# Patient Record
Sex: MALE | Race: WHITE | Employment: FULL TIME | URBAN - METROPOLITAN AREA
[De-identification: names, ages, dates, MRNs, and addresses within clinical notes are randomized per-mention and may not be internally consistent; named-entity substitution may affect disease eponyms.]

---

## 2017-01-19 ENCOUNTER — APPOINTMENT (OUTPATIENT)
Dept: LAB | Age: 62
End: 2017-01-19
Attending: INTERNAL MEDICINE
Payer: COMMERCIAL

## 2017-01-19 DIAGNOSIS — R73.9 HYPERGLYCEMIA: ICD-10-CM

## 2017-01-19 LAB
GLUCOSE SERPL-MCNC: 227 MG/DL (ref 70–99)
HBA1C MFR BLD: 10.3 % (ref 4–6)

## 2017-01-19 PROCEDURE — 83036 HEMOGLOBIN GLYCOSYLATED A1C: CPT

## 2017-01-19 PROCEDURE — 82947 ASSAY GLUCOSE BLOOD QUANT: CPT

## 2017-01-19 PROCEDURE — 36415 COLL VENOUS BLD VENIPUNCTURE: CPT

## 2017-01-21 ENCOUNTER — TELEPHONE (OUTPATIENT)
Dept: INTERNAL MEDICINE CLINIC | Facility: CLINIC | Age: 62
End: 2017-01-21

## 2017-01-22 NOTE — TELEPHONE ENCOUNTER
I called the patient with the results of the blood glucose over 200 fasting and also hemoglobin A1c of 10.3. This was repeated because the patient initially when he was high he stated that he was drinking Coca[employer]a.   Now he was fasting and he confirms norbert

## 2017-01-26 ENCOUNTER — OFFICE VISIT (OUTPATIENT)
Dept: INTERNAL MEDICINE CLINIC | Facility: CLINIC | Age: 62
End: 2017-01-26

## 2017-01-26 VITALS
DIASTOLIC BLOOD PRESSURE: 93 MMHG | WEIGHT: 243.81 LBS | SYSTOLIC BLOOD PRESSURE: 163 MMHG | BODY MASS INDEX: 32 KG/M2 | TEMPERATURE: 98 F | HEART RATE: 67 BPM

## 2017-01-26 DIAGNOSIS — E11.9 NEWLY DIAGNOSED DIABETES (HCC): Primary | ICD-10-CM

## 2017-01-26 DIAGNOSIS — E55.9 VITAMIN D DEFICIENCY: ICD-10-CM

## 2017-01-26 DIAGNOSIS — E78.5 HYPERLIPIDEMIA LDL GOAL <100: ICD-10-CM

## 2017-01-26 DIAGNOSIS — I10 ESSENTIAL HYPERTENSION: ICD-10-CM

## 2017-01-26 PROCEDURE — 99212 OFFICE O/P EST SF 10 MIN: CPT | Performed by: INTERNAL MEDICINE

## 2017-01-26 PROCEDURE — 99214 OFFICE O/P EST MOD 30 MIN: CPT | Performed by: INTERNAL MEDICINE

## 2017-01-26 RX ORDER — ATORVASTATIN CALCIUM 20 MG/1
20 TABLET, FILM COATED ORAL NIGHTLY
Qty: 90 TABLET | Refills: 1 | Status: SHIPPED | OUTPATIENT
Start: 2017-01-26 | End: 2017-04-27

## 2017-01-26 RX ORDER — LOSARTAN POTASSIUM 25 MG/1
25 TABLET ORAL DAILY
Qty: 90 TABLET | Refills: 1 | Status: SHIPPED | OUTPATIENT
Start: 2017-01-26 | End: 2017-04-27

## 2017-01-26 NOTE — PROGRESS NOTES
HPI:    Patient ID: Darius Draper is a 62year old male. Patient presents to discuss his lab results. Diabetes  He presents for his initial (HgbA1C on 1/19/2017 was 10.3) diabetic visit. He has type 2 diabetes mellitus.  His disease course has been wors Endocrine: Negative for polydipsia, polyphagia and polyuria. HISTORY:  History reviewed. No pertinent past medical history.        Past Surgical History    REPAIR BOWEL-SKIN FISTULA N/A     Comment 34 years ago       Family History   Problem Relatio ASSESSMENT/PLAN:   (E11.9) Newly diagnosed diabetes ([facility] [address]  (primary encounter diagnosis)  1/19/2017 HgA1c 10.3- uncontrolled. AST 16 and ALT 30. Patient denies any associated symptoms such as polydipsia, polyuria, or polyphagia.  He denies any FH test done in 3 months. The patient was instructed to follow a low-fat diet. Instructed the following: Eat fewer animal products in general. Eat less/leaner cuts of red meat. Replace red meat with fish and poultry.  To eat eat more fiber-rich foods that

## 2017-01-27 ENCOUNTER — TELEPHONE (OUTPATIENT)
Dept: INTERNAL MEDICINE CLINIC | Facility: CLINIC | Age: 62
End: 2017-01-27

## 2017-01-27 NOTE — TELEPHONE ENCOUNTER
Wife would like to speak to RN about pt testing strips and kit that was suppose to have been prescribed to him.

## 2017-01-30 RX ORDER — LANCETS 30 GAUGE
EACH MISCELLANEOUS
Qty: 200 EACH | Refills: 1 | Status: SHIPPED | OUTPATIENT
Start: 2017-01-30

## 2017-01-30 NOTE — TELEPHONE ENCOUNTER
ASSESSMENT/PLAN:    (E11.9) Newly diagnosed diabetes (HCC)  (primary encounter diagnosis)  1/19/2017 HgA1c 10.3- uncontrolled. AST 16 and ALT 30. Patient denies any associated symptoms such as polydipsia, polyuria, or polyphagia.  He denies any FHx of Pb Cipro

## 2017-01-30 NOTE — TELEPHONE ENCOUNTER
Wife wondering if necessary for pt to check glucose daily. I advised her it is recommended pt check his sugar as prescribed, especially as new diabetic with new rx regimen. Symptoms of hypoglycemia discussed and wife verbalized understanding.  Pt will call

## 2017-02-13 ENCOUNTER — TELEPHONE (OUTPATIENT)
Dept: INTERNAL MEDICINE CLINIC | Facility: CLINIC | Age: 62
End: 2017-02-13

## 2017-02-14 NOTE — TELEPHONE ENCOUNTER
Pt was prescribed a new meter with test strips. He also needs lancets. Could we please send in a script for lancets?

## 2017-02-17 NOTE — TELEPHONE ENCOUNTER
LMTCB please transfer to triage or x [phone] if by 5pm today. According to pt chart, all diabetic supplies were ordered 1/30/17. Unsure if he needs lancets, if so, any specific one?

## 2017-02-17 NOTE — TELEPHONE ENCOUNTER
Pt's wife is calling back. Pt's wife spoke to Pharmacy states they did received Rx for lancets, after they had told her they didn't. No call back needed.

## 2017-03-13 ENCOUNTER — TELEPHONE (OUTPATIENT)
Dept: INTERNAL MEDICINE CLINIC | Facility: CLINIC | Age: 62
End: 2017-03-13

## 2017-03-14 NOTE — TELEPHONE ENCOUNTER
Informed pt per Dr. Coates Prim message below. Repeat lab test in April. And f/u, Verbalized understanding.

## 2017-03-14 NOTE — TELEPHONE ENCOUNTER
Returned call to spouse even though message was clearly relayed to pt directly.   Message left to return call to office if needs further clarification

## 2017-03-14 NOTE — TELEPHONE ENCOUNTER
Reviewed. To continue monitoring the blood pressure. The patient glucose was also high and he needs to make a follow-up appointment sometime for follow-up for blood pressure and diabetes.

## 2017-03-14 NOTE — TELEPHONE ENCOUNTER
Per 1/26/17 OV were to follow up with blood pressure readings. Patient denies any symptoms, headache, nausea, dizziness.   Stated feels normal.

## 2017-03-14 NOTE — TELEPHONE ENCOUNTER
Pt's wife is calling for provide Pt's B/p reading.  Please advise    3-:415/68  3-:150/89  4-:915/63

## 2017-03-14 NOTE — TELEPHONE ENCOUNTER
[facility] please transfer to triage or x [phone] if by 5pm today.

## 2017-04-21 ENCOUNTER — TELEPHONE (OUTPATIENT)
Dept: INTERNAL MEDICINE CLINIC | Facility: CLINIC | Age: 62
End: 2017-04-21

## 2017-04-21 NOTE — TELEPHONE ENCOUNTER
Pt's wife stts pt has red bumps on arms and feels fatigue. Wife asking for an nurse to call pt.        Pt can be reached @ [phone]

## 2017-04-21 NOTE — TELEPHONE ENCOUNTER
Actions Requested: JACE PALMER, acute visitbooked 4/21  Situation/Background   Problem: rash   Onset: 1 week   Associated Symptoms: none   History of Same:    Precipitated By:    Aggravating/Alleviating Factors:    Timing:    Possible Etiology:  Infection:   ,

## 2017-04-27 ENCOUNTER — OFFICE VISIT (OUTPATIENT)
Dept: INTERNAL MEDICINE CLINIC | Facility: CLINIC | Age: 62
End: 2017-04-27

## 2017-04-27 VITALS
DIASTOLIC BLOOD PRESSURE: 84 MMHG | TEMPERATURE: 99 F | WEIGHT: 232.19 LBS | BODY MASS INDEX: 30 KG/M2 | SYSTOLIC BLOOD PRESSURE: 124 MMHG | HEART RATE: 61 BPM

## 2017-04-27 DIAGNOSIS — I10 ESSENTIAL HYPERTENSION: ICD-10-CM

## 2017-04-27 DIAGNOSIS — E78.5 HYPERLIPIDEMIA LDL GOAL <100: Primary | ICD-10-CM

## 2017-04-27 DIAGNOSIS — IMO0001 UNCONTROLLED DIABETES MELLITUS TYPE 2 WITHOUT COMPLICATIONS, UNSPECIFIED LONG TERM INSULIN USE STATUS: ICD-10-CM

## 2017-04-27 DIAGNOSIS — E55.9 VITAMIN D DEFICIENCY: ICD-10-CM

## 2017-04-27 PROCEDURE — 99214 OFFICE O/P EST MOD 30 MIN: CPT | Performed by: INTERNAL MEDICINE

## 2017-04-27 PROCEDURE — 99212 OFFICE O/P EST SF 10 MIN: CPT | Performed by: INTERNAL MEDICINE

## 2017-04-27 RX ORDER — ATORVASTATIN CALCIUM 20 MG/1
20 TABLET, FILM COATED ORAL NIGHTLY
Qty: 90 TABLET | Refills: 1 | Status: SHIPPED | OUTPATIENT
Start: 2017-04-27 | End: 2017-04-27

## 2017-04-27 RX ORDER — LOSARTAN POTASSIUM 25 MG/1
25 TABLET ORAL DAILY
Qty: 90 TABLET | Refills: 1 | Status: SHIPPED | OUTPATIENT
Start: 2017-04-27 | End: 2018-02-05

## 2017-04-27 RX ORDER — ATORVASTATIN CALCIUM 20 MG/1
20 TABLET, FILM COATED ORAL NIGHTLY
Qty: 90 TABLET | Refills: 1 | Status: SHIPPED | OUTPATIENT
Start: 2017-04-27 | End: 2018-05-31

## 2017-04-27 NOTE — PROGRESS NOTES
HPI:    Patient ID: Juan Ricci is a 62year old male. PMHx includes Vitamin D Deficiency. HPI  Diabetes  He presents for his initial diabetic visit. He has type 2 diabetes mellitus. Disease course: Labs on 01/19/2017 indicated Hg A1c at 10.3.  Pertinent HISTORY:  History reviewed. No pertinent past medical history.        Past Surgical History    REPAIR BOWEL-SKIN FISTULA N/A     Comment 34 years ago       Family History   Problem Relation Age of Onset   • Cancer Paternal Grandmother         Smoking St HDL 34 11/12/2016     Lab Results  Component Value Date   * 11/12/2016     Lab Results  Component Value Date   TRIG 148 11/12/2016     Lab Results  Component Value Date   AST 16 11/12/2016     Lab Results  Component Value Date   ALT 30 11/12/2016 RANDOM URINE  - LIPID PANEL  - HEMOGLOBIN A1C          (E55.9) Vitamin D deficiency  Labs on 11/12/2016 show Vitamin D, 25OH at 11.8. He does not currently take supplements for this.    Plan:   - VITAMIN D, 25-HYDROXY      Meds This Visit:  Pending Prescrip

## 2017-06-15 ENCOUNTER — APPOINTMENT (OUTPATIENT)
Dept: LAB | Facility: HOSPITAL | Age: 62
End: 2017-06-15
Attending: INTERNAL MEDICINE
Payer: COMMERCIAL

## 2017-06-15 DIAGNOSIS — E78.5 HYPERLIPIDEMIA LDL GOAL <100: ICD-10-CM

## 2017-06-15 DIAGNOSIS — IMO0001 UNCONTROLLED DIABETES MELLITUS TYPE 2 WITHOUT COMPLICATIONS, UNSPECIFIED LONG TERM INSULIN USE STATUS: ICD-10-CM

## 2017-06-15 DIAGNOSIS — E55.9 VITAMIN D DEFICIENCY: ICD-10-CM

## 2017-06-15 PROCEDURE — 80061 LIPID PANEL: CPT

## 2017-06-15 PROCEDURE — 82306 VITAMIN D 25 HYDROXY: CPT

## 2017-06-15 PROCEDURE — 82043 UR ALBUMIN QUANTITATIVE: CPT

## 2017-06-15 PROCEDURE — 83036 HEMOGLOBIN GLYCOSYLATED A1C: CPT

## 2017-06-15 PROCEDURE — 82570 ASSAY OF URINE CREATININE: CPT

## 2017-06-15 PROCEDURE — 36415 COLL VENOUS BLD VENIPUNCTURE: CPT

## 2017-06-17 ENCOUNTER — TELEPHONE (OUTPATIENT)
Dept: INTERNAL MEDICINE CLINIC | Facility: CLINIC | Age: 62
End: 2017-06-17

## 2017-06-17 DIAGNOSIS — E11.9 TYPE 2 DIABETES MELLITUS WITHOUT COMPLICATION, UNSPECIFIED LONG TERM INSULIN USE STATUS: Primary | ICD-10-CM

## 2017-06-17 DIAGNOSIS — E55.9 VITAMIN D DEFICIENCY: ICD-10-CM

## 2017-06-17 NOTE — TELEPHONE ENCOUNTER
Patient's wife on hippa form requesting results of blood work that   Patient had done on Thursday.  Please call back to advise

## 2017-06-19 NOTE — TELEPHONE ENCOUNTER
Verified name and  with pt wife. Pt has called twice for results. Informed pt wife need to be reviewed by Dr Marcel Rubio and pt wife verb understanding.

## 2017-06-20 NOTE — TELEPHONE ENCOUNTER
Informed spouse test results and Dr. Sasha Edmond message. Low cholesterol diet info mailed orders placed for repeat vit D. Verbalized understanding.

## 2017-06-20 NOTE — TELEPHONE ENCOUNTER
Recent was 20.1 and the normal is 0-20.0. Cholesterol panel show LDL elevated at 131. Ideally should be under 100. Patient needs to work with his diet.   Please send the patient low-cholesterol diet information from epic and sent to the patient with the

## 2017-10-20 DIAGNOSIS — I10 ESSENTIAL HYPERTENSION: ICD-10-CM

## 2018-02-04 ENCOUNTER — TELEPHONE (OUTPATIENT)
Dept: INTERNAL MEDICINE CLINIC | Facility: CLINIC | Age: 63
End: 2018-02-04

## 2018-02-04 DIAGNOSIS — I10 ESSENTIAL HYPERTENSION: ICD-10-CM

## 2018-02-05 RX ORDER — LOSARTAN POTASSIUM 25 MG/1
TABLET ORAL
Qty: 90 TABLET | Refills: 0 | Status: SHIPPED | OUTPATIENT
Start: 2018-02-05 | End: 2018-05-09

## 2018-02-05 NOTE — TELEPHONE ENCOUNTER
Wife Ashley David (on HIPAA) called about refill request; advised the blood pressure medication sent to pharmacy. Appointment scheduled for the patient with Dr. Danna Duncan as wife is a patient of Dr. Danna Duncan.  Patient scheduled for Monday 2/19/18 at 12:20 pm.    Dr. Edwards File

## 2018-02-19 ENCOUNTER — APPOINTMENT (OUTPATIENT)
Dept: LAB | Facility: HOSPITAL | Age: 63
End: 2018-02-19
Attending: INTERNAL MEDICINE
Payer: COMMERCIAL

## 2018-02-19 ENCOUNTER — OFFICE VISIT (OUTPATIENT)
Dept: INTERNAL MEDICINE CLINIC | Facility: CLINIC | Age: 63
End: 2018-02-19

## 2018-02-19 VITALS
DIASTOLIC BLOOD PRESSURE: 74 MMHG | HEIGHT: 73.5 IN | HEART RATE: 82 BPM | BODY MASS INDEX: 30.75 KG/M2 | WEIGHT: 237.06 LBS | SYSTOLIC BLOOD PRESSURE: 130 MMHG | TEMPERATURE: 98 F

## 2018-02-19 DIAGNOSIS — E11.69 DYSLIPIDEMIA ASSOCIATED WITH TYPE 2 DIABETES MELLITUS (HCC): ICD-10-CM

## 2018-02-19 DIAGNOSIS — E78.5 DYSLIPIDEMIA ASSOCIATED WITH TYPE 2 DIABETES MELLITUS (HCC): ICD-10-CM

## 2018-02-19 DIAGNOSIS — Z00.00 ANNUAL PHYSICAL EXAM: ICD-10-CM

## 2018-02-19 DIAGNOSIS — E11.9 TYPE 2 DIABETES MELLITUS WITHOUT COMPLICATION, WITHOUT LONG-TERM CURRENT USE OF INSULIN (HCC): ICD-10-CM

## 2018-02-19 DIAGNOSIS — Z00.00 ANNUAL PHYSICAL EXAM: Primary | ICD-10-CM

## 2018-02-19 DIAGNOSIS — I10 ESSENTIAL HYPERTENSION: ICD-10-CM

## 2018-02-19 LAB
ALBUMIN SERPL BCP-MCNC: 4.4 G/DL (ref 3.5–4.8)
ALBUMIN/GLOB SERPL: 1.6 {RATIO} (ref 1–2)
ALP SERPL-CCNC: 44 U/L (ref 32–100)
ALT SERPL-CCNC: 28 U/L (ref 17–63)
ANION GAP SERPL CALC-SCNC: 8 MMOL/L (ref 0–18)
AST SERPL-CCNC: 18 U/L (ref 15–41)
BILIRUB SERPL-MCNC: 0.9 MG/DL (ref 0.3–1.2)
BUN SERPL-MCNC: 10 MG/DL (ref 8–20)
BUN/CREAT SERPL: 10.3 (ref 10–20)
CALCIUM SERPL-MCNC: 9.3 MG/DL (ref 8.5–10.5)
CHLORIDE SERPL-SCNC: 105 MMOL/L (ref 95–110)
CHOLEST SERPL-MCNC: 204 MG/DL (ref 110–200)
CO2 SERPL-SCNC: 25 MMOL/L (ref 22–32)
CREAT SERPL-MCNC: 0.97 MG/DL (ref 0.5–1.5)
CREAT UR-MCNC: 87.9 MG/DL
ERYTHROCYTE [DISTWIDTH] IN BLOOD BY AUTOMATED COUNT: 13.8 % (ref 11–15)
GLOBULIN PLAS-MCNC: 2.8 G/DL (ref 2.5–3.7)
GLUCOSE SERPL-MCNC: 162 MG/DL (ref 70–99)
HBA1C MFR BLD: 7.4 % (ref 4–6)
HCT VFR BLD AUTO: 48.4 % (ref 41–52)
HDLC SERPL-MCNC: 42 MG/DL
HGB BLD-MCNC: 16.1 G/DL (ref 13.5–17.5)
LDLC SERPL CALC-MCNC: 128 MG/DL (ref 0–99)
MCH RBC QN AUTO: 28.7 PG (ref 27–32)
MCHC RBC AUTO-ENTMCNC: 33.2 G/DL (ref 32–37)
MCV RBC AUTO: 86.6 FL (ref 80–100)
MICROALBUMIN UR-MCNC: 23 MG/DL (ref 0–1.8)
MICROALBUMIN/CREAT UR: 261.7 MG/G{CREAT} (ref 0–20)
NONHDLC SERPL-MCNC: 162 MG/DL
OSMOLALITY UR CALC.SUM OF ELEC: 289 MOSM/KG (ref 275–295)
PATIENT FASTING: YES
PLATELET # BLD AUTO: 147 K/UL (ref 140–400)
PMV BLD AUTO: 9.2 FL (ref 7.4–10.3)
POTASSIUM SERPL-SCNC: 4.3 MMOL/L (ref 3.3–5.1)
PROT SERPL-MCNC: 7.2 G/DL (ref 5.9–8.4)
PSA SERPL-MCNC: 1.7 NG/ML (ref 0–4)
RBC # BLD AUTO: 5.59 M/UL (ref 4.5–5.9)
SODIUM SERPL-SCNC: 138 MMOL/L (ref 136–144)
TRIGL SERPL-MCNC: 168 MG/DL (ref 1–149)
WBC # BLD AUTO: 6 K/UL (ref 4–11)

## 2018-02-19 PROCEDURE — 80061 LIPID PANEL: CPT

## 2018-02-19 PROCEDURE — 83036 HEMOGLOBIN GLYCOSYLATED A1C: CPT

## 2018-02-19 PROCEDURE — 82570 ASSAY OF URINE CREATININE: CPT

## 2018-02-19 PROCEDURE — 36415 COLL VENOUS BLD VENIPUNCTURE: CPT

## 2018-02-19 PROCEDURE — 85027 COMPLETE CBC AUTOMATED: CPT

## 2018-02-19 PROCEDURE — 99396 PREV VISIT EST AGE 40-64: CPT | Performed by: INTERNAL MEDICINE

## 2018-02-19 PROCEDURE — 82043 UR ALBUMIN QUANTITATIVE: CPT

## 2018-02-19 PROCEDURE — 80053 COMPREHEN METABOLIC PANEL: CPT

## 2018-02-19 NOTE — PATIENT INSTRUCTIONS
Await results of today's blood and urine testing. Please schedule an appointment with Ophthalmology. Continue current medications. Please try to eat healthy, exercise regularly and lose weight. Return visit in 6 months.

## 2018-02-26 ENCOUNTER — TELEPHONE (OUTPATIENT)
Dept: INTERNAL MEDICINE CLINIC | Facility: CLINIC | Age: 63
End: 2018-02-26

## 2018-02-26 NOTE — TELEPHONE ENCOUNTER
Patient's wife, Flavia Olivas, aware lab results and Dr. Doe Serum instructions. Per wife, she is not sure if patient is taking current dose of atorvastatin 20 mg nightly.  As a result, wife states patient will hold off on increasing atorvastatin to 40 mg nightly

## 2018-05-09 DIAGNOSIS — I10 ESSENTIAL HYPERTENSION: ICD-10-CM

## 2018-05-10 RX ORDER — LOSARTAN POTASSIUM 25 MG/1
TABLET ORAL
Qty: 90 TABLET | Refills: 0 | Status: SHIPPED | OUTPATIENT
Start: 2018-05-10 | End: 2018-10-25

## 2018-05-10 NOTE — TELEPHONE ENCOUNTER
Hypertensive Medications Protocol Passed5/9 8:34 PM   CMP or BMP in past 12 months    Serum Creatinine < 2.0    Appointment in past 6 or next 3 months     Medication refilled for 90 days per protocol.

## 2018-05-30 ENCOUNTER — TELEPHONE (OUTPATIENT)
Dept: INTERNAL MEDICINE CLINIC | Facility: CLINIC | Age: 63
End: 2018-05-30

## 2018-05-31 DIAGNOSIS — E78.5 HYPERLIPIDEMIA LDL GOAL <100: ICD-10-CM

## 2018-05-31 RX ORDER — ATORVASTATIN CALCIUM 20 MG/1
20 TABLET, FILM COATED ORAL NIGHTLY
Qty: 90 TABLET | Refills: 1 | Status: ON HOLD | OUTPATIENT
Start: 2018-05-31 | End: 2019-07-13

## 2018-05-31 NOTE — TELEPHONE ENCOUNTER
Protocol failed, please advise on prescription request  Cholesterol Medications  Protocol Criteria:  · Appointment scheduled in the past 12 months or in the next 3 months  · ALT & LDL on file in the past 12 months  · ALT result < 80  · LDL result <130   Re

## 2018-05-31 NOTE — TELEPHONE ENCOUNTER
Pt stts refill on RX Atorvastatin 20 MG. Please advise           Current Outpatient Prescriptions:        Atorvastatin Calcium 20 MG Oral Tab Take 1 tablet (20 mg total) by mouth nightly.  Disp: 90 tablet Rfl: 1

## 2018-06-01 NOTE — TELEPHONE ENCOUNTER
[address], [phone], [phone]   Medication Detail      Disp Refills Start End    atorvastatin 20 MG Oral Tab 90 tablet 1 5/31/2018     Sig - Route:  Take 1 tablet (

## 2018-06-10 DIAGNOSIS — IMO0001 UNCONTROLLED DIABETES MELLITUS TYPE 2 WITHOUT COMPLICATIONS: ICD-10-CM

## 2018-08-09 ENCOUNTER — TELEPHONE (OUTPATIENT)
Dept: CASE MANAGEMENT | Age: 63
End: 2018-08-09

## 2018-09-25 ENCOUNTER — TELEPHONE (OUTPATIENT)
Dept: CASE MANAGEMENT | Age: 63
End: 2018-09-25

## 2018-09-25 NOTE — TELEPHONE ENCOUNTER
Patient is due for DM eye exam. Report never received from previous eye doctor. Left message to call back [phone].

## 2018-10-25 DIAGNOSIS — I10 ESSENTIAL HYPERTENSION: ICD-10-CM

## 2018-10-27 RX ORDER — LOSARTAN POTASSIUM 25 MG/1
TABLET ORAL
Qty: 90 TABLET | Refills: 0 | Status: SHIPPED | OUTPATIENT
Start: 2018-10-27 | End: 2019-03-19 | Stop reason: DRUGHIGH

## 2019-01-01 ENCOUNTER — EXTERNAL RECORD (OUTPATIENT)
Dept: HEALTH INFORMATION MANAGEMENT | Facility: OTHER | Age: 64
End: 2019-01-01

## 2019-02-01 DIAGNOSIS — I10 ESSENTIAL HYPERTENSION: ICD-10-CM

## 2019-02-15 DIAGNOSIS — I10 ESSENTIAL HYPERTENSION: ICD-10-CM

## 2019-02-16 NOTE — TELEPHONE ENCOUNTER
Please review; protocol failed.     Pt overdue for OV    CSS- please schedule pt    Routed to MD for review

## 2019-02-16 NOTE — TELEPHONE ENCOUNTER
Recent Visits  Date Type Provider Dept   02/19/18 Office Visit Jamel Hubbard MD [facility]-Internal Med   Showing recent visits within past 540 days with a meds authorizing provider and meeting all other requirements     Future Appointments  No visits were found meeting these conditions.    Showing future appointments within next 150 days with a meds authorizing provider and meeting all other requirements

## 2019-02-19 RX ORDER — LOSARTAN POTASSIUM 25 MG/1
TABLET ORAL
Qty: 30 TABLET | Refills: 0 | OUTPATIENT
Start: 2019-02-19

## 2019-02-19 NOTE — TELEPHONE ENCOUNTER
I have not seen this patient in the office. I refilled last time as he was patient of Dr. Sarah Landers. Patient needs to follow up. Last seen by Dr Gaurav Dodge in 2/18.

## 2019-03-06 RX ORDER — LOSARTAN POTASSIUM 25 MG/1
TABLET ORAL
Qty: 90 TABLET | Refills: 0 | OUTPATIENT
Start: 2019-03-06

## 2019-03-19 ENCOUNTER — APPOINTMENT (OUTPATIENT)
Dept: LAB | Facility: HOSPITAL | Age: 64
End: 2019-03-19
Attending: INTERNAL MEDICINE
Payer: COMMERCIAL

## 2019-03-19 ENCOUNTER — OFFICE VISIT (OUTPATIENT)
Dept: INTERNAL MEDICINE CLINIC | Facility: CLINIC | Age: 64
End: 2019-03-19

## 2019-03-19 VITALS
WEIGHT: 241 LBS | HEIGHT: 73.5 IN | BODY MASS INDEX: 31.26 KG/M2 | DIASTOLIC BLOOD PRESSURE: 82 MMHG | SYSTOLIC BLOOD PRESSURE: 148 MMHG | HEART RATE: 79 BPM

## 2019-03-19 DIAGNOSIS — R80.9 TYPE 2 DIABETES MELLITUS WITH MICROALBUMINURIA, WITHOUT LONG-TERM CURRENT USE OF INSULIN (HCC): ICD-10-CM

## 2019-03-19 DIAGNOSIS — E11.29 TYPE 2 DIABETES MELLITUS WITH MICROALBUMINURIA, WITHOUT LONG-TERM CURRENT USE OF INSULIN (HCC): ICD-10-CM

## 2019-03-19 DIAGNOSIS — I10 ESSENTIAL HYPERTENSION: ICD-10-CM

## 2019-03-19 DIAGNOSIS — Z00.00 ANNUAL PHYSICAL EXAM: ICD-10-CM

## 2019-03-19 DIAGNOSIS — E78.5 DYSLIPIDEMIA ASSOCIATED WITH TYPE 2 DIABETES MELLITUS (HCC): ICD-10-CM

## 2019-03-19 DIAGNOSIS — E11.69 DYSLIPIDEMIA ASSOCIATED WITH TYPE 2 DIABETES MELLITUS (HCC): ICD-10-CM

## 2019-03-19 DIAGNOSIS — Z00.00 ANNUAL PHYSICAL EXAM: Primary | ICD-10-CM

## 2019-03-19 LAB
ALBUMIN SERPL-MCNC: 3.9 G/DL (ref 3.4–5)
ALBUMIN/GLOB SERPL: 1.2 {RATIO} (ref 1–2)
ALP LIVER SERPL-CCNC: 66 U/L (ref 45–117)
ALT SERPL-CCNC: 32 U/L (ref 16–61)
ANION GAP SERPL CALC-SCNC: 7 MMOL/L (ref 0–18)
AST SERPL-CCNC: 11 U/L (ref 15–37)
BILIRUB SERPL-MCNC: 0.5 MG/DL (ref 0.1–2)
BUN BLD-MCNC: 16 MG/DL (ref 7–18)
BUN/CREAT SERPL: 15.4 (ref 10–20)
CALCIUM BLD-MCNC: 8.9 MG/DL (ref 8.5–10.1)
CHLORIDE SERPL-SCNC: 110 MMOL/L (ref 98–107)
CHOLEST SMN-MCNC: 198 MG/DL (ref ?–200)
CO2 SERPL-SCNC: 24 MMOL/L (ref 21–32)
COMPLEXED PSA SERPL-MCNC: 1.32 NG/ML (ref ?–4)
CREAT BLD-MCNC: 1.04 MG/DL (ref 0.7–1.3)
CREAT UR-SCNC: 134 MG/DL
DEPRECATED RDW RBC AUTO: 40.6 FL (ref 35.1–46.3)
ERYTHROCYTE [DISTWIDTH] IN BLOOD BY AUTOMATED COUNT: 13.2 % (ref 11–15)
EST. AVERAGE GLUCOSE BLD GHB EST-MCNC: 212 MG/DL (ref 68–126)
GLOBULIN PLAS-MCNC: 3.2 G/DL (ref 2.8–4.4)
GLUCOSE BLD-MCNC: 217 MG/DL (ref 70–99)
HBA1C MFR BLD HPLC: 9 % (ref ?–5.7)
HCT VFR BLD AUTO: 49.3 % (ref 39–53)
HDLC SERPL-MCNC: 37 MG/DL (ref 40–59)
HGB BLD-MCNC: 16 G/DL (ref 13–17.5)
LDLC SERPL CALC-MCNC: 132 MG/DL (ref ?–100)
M PROTEIN MFR SERPL ELPH: 7.1 G/DL (ref 6.4–8.2)
MCH RBC QN AUTO: 28.1 PG (ref 26–34)
MCHC RBC AUTO-ENTMCNC: 32.5 G/DL (ref 31–37)
MCV RBC AUTO: 86.6 FL (ref 80–100)
MICROALBUMIN UR-MCNC: 14.7 MG/DL
MICROALBUMIN/CREAT 24H UR-RTO: 109.7 UG/MG (ref ?–30)
NONHDLC SERPL-MCNC: 161 MG/DL (ref ?–130)
OSMOLALITY SERPL CALC.SUM OF ELEC: 300 MOSM/KG (ref 275–295)
PLATELET # BLD AUTO: 165 10(3)UL (ref 150–450)
POTASSIUM SERPL-SCNC: 4.7 MMOL/L (ref 3.5–5.1)
RBC # BLD AUTO: 5.69 X10(6)UL (ref 4.3–5.7)
SODIUM SERPL-SCNC: 141 MMOL/L (ref 136–145)
TRIGL SERPL-MCNC: 145 MG/DL (ref 30–149)
VLDLC SERPL CALC-MCNC: 29 MG/DL (ref 0–30)
WBC # BLD AUTO: 4.4 X10(3) UL (ref 4–11)

## 2019-03-19 PROCEDURE — 80061 LIPID PANEL: CPT

## 2019-03-19 PROCEDURE — 99396 PREV VISIT EST AGE 40-64: CPT | Performed by: INTERNAL MEDICINE

## 2019-03-19 PROCEDURE — 82570 ASSAY OF URINE CREATININE: CPT

## 2019-03-19 PROCEDURE — 80053 COMPREHEN METABOLIC PANEL: CPT

## 2019-03-19 PROCEDURE — 82043 UR ALBUMIN QUANTITATIVE: CPT

## 2019-03-19 PROCEDURE — 83036 HEMOGLOBIN GLYCOSYLATED A1C: CPT

## 2019-03-19 PROCEDURE — 36415 COLL VENOUS BLD VENIPUNCTURE: CPT

## 2019-03-19 PROCEDURE — 85027 COMPLETE CBC AUTOMATED: CPT

## 2019-03-19 RX ORDER — LOSARTAN POTASSIUM 50 MG/1
50 TABLET ORAL DAILY
Qty: 90 TABLET | Refills: 0 | Status: ON HOLD | OUTPATIENT
Start: 2019-03-19 | End: 2019-07-15

## 2019-03-19 NOTE — H&P
Chanell Castrejon is a 59year old male who presents for a complete physical exam, as well as for follow-up of type 2 diabetes, hypertension and dyslipidemia. HPI:   His last visit here was for a physical February 2018.   He feels well, and he has not seen a ph History:   Diagnosis Date   • Diabetic nephropathy ([facility]     Microalbuminuria   • Dyslipidemia associated with type 2 diabetes mellitus ([facility]    • Essential hypertension    • Type 2 diabetes mellitus ([facility] 2016      Past Surgical History:   Procedure Later without ulcerations or lesions  PULSES: Carotid upstrokes 2+ without carotid bruits, distal pulses 2+ bilateral dorsalis pedis and posterior tibial  NEURO: Sensory testing with the 10 g monofilament diabetic sensory exam instrument is normal in both feet

## 2019-03-19 NOTE — PATIENT INSTRUCTIONS
Await results of today's blood and urine testing. Please increase losartan to 50 mg daily. Continue other medications. Please schedule an appointment with Ophthalmology for an eye exam.  Return visit in 1 month.

## 2019-03-22 ENCOUNTER — TELEPHONE (OUTPATIENT)
Dept: INTERNAL MEDICINE CLINIC | Facility: CLINIC | Age: 64
End: 2019-03-22

## 2019-03-22 NOTE — TELEPHONE ENCOUNTER
Lab Results:   Written by Paul Forrester MD on 3/19/2019  3:20 PM   Your chemistry panel is normal except for an elevated glucose.  Blood count is normal.  Glycohemoglobin is elevated at 9.0% (target 7.0%).  Cholesterol panel is normal except for low HDL

## 2019-03-26 NOTE — TELEPHONE ENCOUNTER
Patient's wife notified of lab results. Patient's wife verbalizes understanding of Dr. Elia Sosa instructions. Patient authorized wife to receive results on 3/22/19. Wife states script for Meformin 1000 mg twice daily needs to be sent to pharmacy.    Wi

## 2019-03-26 NOTE — TELEPHONE ENCOUNTER
I would recommend that he take glimepiride in addition to metformin. However if he wants he can simply take metformin and see how he does.

## 2019-03-27 ENCOUNTER — TELEPHONE (OUTPATIENT)
Dept: OTHER | Age: 64
End: 2019-03-27

## 2019-05-22 RX ORDER — LOSARTAN POTASSIUM 25 MG/1
TABLET ORAL
Qty: 90 TABLET | Refills: 0 | OUTPATIENT
Start: 2019-05-22

## 2019-06-14 ENCOUNTER — TELEPHONE (OUTPATIENT)
Dept: CASE MANAGEMENT | Age: 64
End: 2019-06-14

## 2019-06-14 DIAGNOSIS — E11.9 TYPE 2 DIABETES MELLITUS WITHOUT COMPLICATION, WITHOUT LONG-TERM CURRENT USE OF INSULIN (HCC): Primary | ICD-10-CM

## 2019-06-14 NOTE — TELEPHONE ENCOUNTER
Your diabetic patient has an A1C of 9 or more. In order to help gain control of the diabetes, we are generating a referral for the patient to be seen by the Endocrine service for diabetic evaluation.  Please sign the referral, and we will contact the genia

## 2019-06-20 NOTE — TELEPHONE ENCOUNTER
Referral for DM evaluation with Britany ABBASI in file and patient also due for DM eye exam, referral in file. Left message to call back [phone].

## 2019-07-10 ENCOUNTER — PATIENT OUTREACH (OUTPATIENT)
Dept: CASE MANAGEMENT | Age: 64
End: 2019-07-10

## 2019-07-10 NOTE — PROGRESS NOTES
Patient is due for DM consult with Zach ABBASI and DM eye exam. Referrals in file. Left message to call back [phone].

## 2019-07-13 ENCOUNTER — HOSPITAL ENCOUNTER (INPATIENT)
Facility: HOSPITAL | Age: 64
LOS: 2 days | Discharge: HOME | DRG: 247 | End: 2019-07-15
Attending: EMERGENCY MEDICINE | Admitting: INTERNAL MEDICINE
Payer: COMMERCIAL

## 2019-07-13 ENCOUNTER — APPOINTMENT (OUTPATIENT)
Dept: INTERVENTIONAL RADIOLOGY/VASCULAR | Facility: HOSPITAL | Age: 64
DRG: 247 | End: 2019-07-13
Attending: INTERNAL MEDICINE
Payer: COMMERCIAL

## 2019-07-13 ENCOUNTER — APPOINTMENT (OUTPATIENT)
Dept: GENERAL RADIOLOGY | Facility: HOSPITAL | Age: 64
DRG: 247 | End: 2019-07-13
Attending: EMERGENCY MEDICINE
Payer: COMMERCIAL

## 2019-07-13 DIAGNOSIS — I24.9 ACUTE CORONARY SYNDROME (HCC): Primary | ICD-10-CM

## 2019-07-13 LAB
ALBUMIN SERPL-MCNC: 3.9 G/DL (ref 3.4–5)
ALP LIVER SERPL-CCNC: 72 U/L (ref 45–117)
ALT SERPL-CCNC: 38 U/L (ref 16–61)
ANION GAP SERPL CALC-SCNC: 6 MMOL/L (ref 0–18)
APTT PPP: 21.1 SECONDS (ref 23.2–35.3)
AST SERPL-CCNC: 33 U/L (ref 15–37)
BASOPHILS # BLD AUTO: 0.02 X10(3) UL (ref 0–0.2)
BASOPHILS NFR BLD AUTO: 0.3 %
BILIRUB DIRECT SERPL-MCNC: 0.1 MG/DL (ref 0–0.2)
BILIRUB SERPL-MCNC: 0.6 MG/DL (ref 0.1–2)
BUN BLD-MCNC: 14 MG/DL (ref 7–18)
BUN/CREAT SERPL: 12.4 (ref 10–20)
CALCIUM BLD-MCNC: 8.5 MG/DL (ref 8.5–10.1)
CHLORIDE SERPL-SCNC: 107 MMOL/L (ref 98–112)
CHOLEST SMN-MCNC: 192 MG/DL (ref ?–200)
CO2 SERPL-SCNC: 27 MMOL/L (ref 21–32)
CREAT BLD-MCNC: 1.13 MG/DL (ref 0.7–1.3)
DEPRECATED RDW RBC AUTO: 42.2 FL (ref 35.1–46.3)
EOSINOPHIL # BLD AUTO: 0.17 X10(3) UL (ref 0–0.7)
EOSINOPHIL NFR BLD AUTO: 2.4 %
ERYTHROCYTE [DISTWIDTH] IN BLOOD BY AUTOMATED COUNT: 13.5 % (ref 11–15)
GLUCOSE BLD-MCNC: 171 MG/DL (ref 70–99)
HCT VFR BLD AUTO: 46.7 % (ref 39–53)
HDLC SERPL-MCNC: 38 MG/DL (ref 40–59)
HGB BLD-MCNC: 15.4 G/DL (ref 13–17.5)
IMM GRANULOCYTES # BLD AUTO: 0.02 X10(3) UL (ref 0–1)
IMM GRANULOCYTES NFR BLD: 0.3 %
INR BLD: 0.98 (ref 0.9–1.2)
LDLC SERPL DIRECT ASSAY-MCNC: 128 MG/DL (ref ?–100)
LYMPHOCYTES # BLD AUTO: 1.48 X10(3) UL (ref 1–4)
LYMPHOCYTES NFR BLD AUTO: 20.5 %
M PROTEIN MFR SERPL ELPH: 7 G/DL (ref 6.4–8.2)
MCH RBC QN AUTO: 28.5 PG (ref 26–34)
MCHC RBC AUTO-ENTMCNC: 33 G/DL (ref 31–37)
MCV RBC AUTO: 86.5 FL (ref 80–100)
MONOCYTES # BLD AUTO: 0.67 X10(3) UL (ref 0.1–1)
MONOCYTES NFR BLD AUTO: 9.3 %
NEUTROPHILS # BLD AUTO: 4.87 X10 (3) UL (ref 1.5–7.7)
NEUTROPHILS # BLD AUTO: 4.87 X10(3) UL (ref 1.5–7.7)
NEUTROPHILS NFR BLD AUTO: 67.2 %
NONHDLC SERPL-MCNC: 154 MG/DL (ref ?–130)
OSMOLALITY SERPL CALC.SUM OF ELEC: 295 MOSM/KG (ref 275–295)
PLATELET # BLD AUTO: 179 10(3)UL (ref 150–450)
POTASSIUM SERPL-SCNC: 4.3 MMOL/L (ref 3.5–5.1)
PROTHROMBIN TIME: 12.8 SECONDS (ref 11.8–14.5)
RBC # BLD AUTO: 5.4 X10(6)UL (ref 4.3–5.7)
SODIUM SERPL-SCNC: 140 MMOL/L (ref 136–145)
TRIGL SERPL-MCNC: 415 MG/DL (ref 30–149)
TROPONIN I SERPL-MCNC: 0.98 NG/ML (ref ?–0.04)
WBC # BLD AUTO: 7.2 X10(3) UL (ref 4–11)

## 2019-07-13 PROCEDURE — 027135Z DILATION OF CORONARY ARTERY, TWO ARTERIES WITH TWO DRUG-ELUTING INTRALUMINAL DEVICES, PERCUTANEOUS APPROACH: ICD-10-PCS | Performed by: INTERNAL MEDICINE

## 2019-07-13 PROCEDURE — B2151ZZ FLUOROSCOPY OF LEFT HEART USING LOW OSMOLAR CONTRAST: ICD-10-PCS | Performed by: INTERNAL MEDICINE

## 2019-07-13 PROCEDURE — 71045 X-RAY EXAM CHEST 1 VIEW: CPT | Performed by: EMERGENCY MEDICINE

## 2019-07-13 PROCEDURE — 4A023N7 MEASUREMENT OF CARDIAC SAMPLING AND PRESSURE, LEFT HEART, PERCUTANEOUS APPROACH: ICD-10-PCS | Performed by: INTERNAL MEDICINE

## 2019-07-13 PROCEDURE — 99223 1ST HOSP IP/OBS HIGH 75: CPT | Performed by: INTERNAL MEDICINE

## 2019-07-13 PROCEDURE — B2111ZZ FLUOROSCOPY OF MULTIPLE CORONARY ARTERIES USING LOW OSMOLAR CONTRAST: ICD-10-PCS | Performed by: INTERNAL MEDICINE

## 2019-07-13 RX ORDER — LOSARTAN POTASSIUM 25 MG/1
25 TABLET ORAL DAILY
Status: DISCONTINUED | OUTPATIENT
Start: 2019-07-14 | End: 2019-07-13

## 2019-07-13 RX ORDER — ASPIRIN 81 MG/1
324 TABLET, CHEWABLE ORAL ONCE
Status: COMPLETED | OUTPATIENT
Start: 2019-07-13 | End: 2019-07-13

## 2019-07-13 RX ORDER — HEPARIN SODIUM 1000 [USP'U]/ML
5000 INJECTION, SOLUTION INTRAVENOUS; SUBCUTANEOUS ONCE
Status: COMPLETED | OUTPATIENT
Start: 2019-07-13 | End: 2019-07-13

## 2019-07-13 RX ORDER — MORPHINE SULFATE 4 MG/ML
INJECTION, SOLUTION INTRAMUSCULAR; INTRAVENOUS
Status: COMPLETED
Start: 2019-07-13 | End: 2019-07-13

## 2019-07-13 RX ORDER — ATORVASTATIN CALCIUM 40 MG/1
80 TABLET, FILM COATED ORAL NIGHTLY
Status: DISCONTINUED | OUTPATIENT
Start: 2019-07-13 | End: 2019-07-15

## 2019-07-13 RX ORDER — HEPARIN SODIUM AND DEXTROSE 10000; 5 [USP'U]/100ML; G/100ML
1000 INJECTION INTRAVENOUS ONCE
Status: DISCONTINUED | OUTPATIENT
Start: 2019-07-13 | End: 2019-07-13

## 2019-07-13 RX ORDER — MORPHINE SULFATE 4 MG/ML
4 INJECTION, SOLUTION INTRAMUSCULAR; INTRAVENOUS ONCE
Status: COMPLETED | OUTPATIENT
Start: 2019-07-13 | End: 2019-07-13

## 2019-07-13 RX ORDER — ACETAMINOPHEN AND CODEINE PHOSPHATE 300; 30 MG/1; MG/1
1 TABLET ORAL EVERY 4 HOURS PRN
Status: DISCONTINUED | OUTPATIENT
Start: 2019-07-13 | End: 2019-07-15

## 2019-07-13 RX ORDER — NITROGLYCERIN 20 MG/100ML
INJECTION INTRAVENOUS AS NEEDED
Status: DISCONTINUED | OUTPATIENT
Start: 2019-07-13 | End: 2019-07-15

## 2019-07-13 RX ORDER — LIDOCAINE HYDROCHLORIDE AND EPINEPHRINE 10; 10 MG/ML; UG/ML
INJECTION, SOLUTION INFILTRATION; PERINEURAL
Status: COMPLETED
Start: 2019-07-13 | End: 2019-07-13

## 2019-07-13 RX ORDER — ACETAMINOPHEN 325 MG/1
650 TABLET ORAL EVERY 4 HOURS PRN
Status: DISCONTINUED | OUTPATIENT
Start: 2019-07-13 | End: 2019-07-15

## 2019-07-13 RX ORDER — ASPIRIN 81 MG/1
81 TABLET ORAL DAILY
Status: DISCONTINUED | OUTPATIENT
Start: 2019-07-14 | End: 2019-07-15

## 2019-07-13 RX ORDER — MIDAZOLAM HYDROCHLORIDE 1 MG/ML
INJECTION INTRAMUSCULAR; INTRAVENOUS
Status: COMPLETED
Start: 2019-07-13 | End: 2019-07-13

## 2019-07-13 RX ORDER — ACETAMINOPHEN AND CODEINE PHOSPHATE 300; 30 MG/1; MG/1
2 TABLET ORAL EVERY 4 HOURS PRN
Status: DISCONTINUED | OUTPATIENT
Start: 2019-07-13 | End: 2019-07-15

## 2019-07-13 RX ORDER — SODIUM CHLORIDE 9 MG/ML
INJECTION, SOLUTION INTRAVENOUS
Status: COMPLETED
Start: 2019-07-13 | End: 2019-07-13

## 2019-07-13 RX ORDER — HEPARIN SODIUM AND DEXTROSE 10000; 5 [USP'U]/100ML; G/100ML
INJECTION INTRAVENOUS CONTINUOUS
Status: DISCONTINUED | OUTPATIENT
Start: 2019-07-14 | End: 2019-07-13

## 2019-07-13 RX ORDER — SODIUM CHLORIDE 9 MG/ML
INJECTION, SOLUTION INTRAVENOUS CONTINUOUS
Status: ACTIVE | OUTPATIENT
Start: 2019-07-13 | End: 2019-07-14

## 2019-07-13 RX ORDER — LIDOCAINE HYDROCHLORIDE 20 MG/ML
INJECTION, SOLUTION EPIDURAL; INFILTRATION; INTRACAUDAL; PERINEURAL
Status: COMPLETED
Start: 2019-07-13 | End: 2019-07-13

## 2019-07-13 RX ORDER — LOSARTAN POTASSIUM 50 MG/1
50 TABLET ORAL DAILY
Status: DISCONTINUED | OUTPATIENT
Start: 2019-07-14 | End: 2019-07-15

## 2019-07-13 RX ORDER — MIDAZOLAM HYDROCHLORIDE 1 MG/ML
1 INJECTION INTRAMUSCULAR; INTRAVENOUS
Status: DISCONTINUED | OUTPATIENT
Start: 2019-07-13 | End: 2019-07-14

## 2019-07-13 RX ORDER — HEPARIN SODIUM 1000 [USP'U]/ML
INJECTION, SOLUTION INTRAVENOUS; SUBCUTANEOUS
Status: DISCONTINUED
Start: 2019-07-13 | End: 2019-07-13 | Stop reason: WASHOUT

## 2019-07-13 RX ORDER — MORPHINE SULFATE 2 MG/ML
2 INJECTION, SOLUTION INTRAMUSCULAR; INTRAVENOUS
Status: ACTIVE | OUTPATIENT
Start: 2019-07-13 | End: 2019-07-14

## 2019-07-14 LAB
ANION GAP SERPL CALC-SCNC: 5 MMOL/L (ref 0–18)
BASOPHILS # BLD AUTO: 0.02 X10(3) UL (ref 0–0.2)
BASOPHILS NFR BLD AUTO: 0.2 %
BUN BLD-MCNC: 12 MG/DL (ref 7–18)
BUN/CREAT SERPL: 14.1 (ref 10–20)
CALCIUM BLD-MCNC: 7.9 MG/DL (ref 8.5–10.1)
CHLORIDE SERPL-SCNC: 109 MMOL/L (ref 98–112)
CO2 SERPL-SCNC: 25 MMOL/L (ref 21–32)
CREAT BLD-MCNC: 0.85 MG/DL (ref 0.7–1.3)
DEPRECATED RDW RBC AUTO: 42.8 FL (ref 35.1–46.3)
EOSINOPHIL # BLD AUTO: 0.12 X10(3) UL (ref 0–0.7)
EOSINOPHIL NFR BLD AUTO: 1.3 %
ERYTHROCYTE [DISTWIDTH] IN BLOOD BY AUTOMATED COUNT: 13.4 % (ref 11–15)
EST. AVERAGE GLUCOSE BLD GHB EST-MCNC: 192 MG/DL (ref 68–126)
GLUCOSE BLD-MCNC: 173 MG/DL (ref 70–99)
GLUCOSE BLDC GLUCOMTR-MCNC: 133 MG/DL (ref 70–99)
GLUCOSE BLDC GLUCOMTR-MCNC: 168 MG/DL (ref 70–99)
GLUCOSE BLDC GLUCOMTR-MCNC: 172 MG/DL (ref 70–99)
GLUCOSE BLDC GLUCOMTR-MCNC: 173 MG/DL (ref 70–99)
HBA1C MFR BLD HPLC: 8.3 % (ref ?–5.7)
HCT VFR BLD AUTO: 41.5 % (ref 39–53)
HGB BLD-MCNC: 13.7 G/DL (ref 13–17.5)
IMM GRANULOCYTES # BLD AUTO: 0.02 X10(3) UL (ref 0–1)
IMM GRANULOCYTES NFR BLD: 0.2 %
LYMPHOCYTES # BLD AUTO: 0.85 X10(3) UL (ref 1–4)
LYMPHOCYTES NFR BLD AUTO: 9.4 %
MCH RBC QN AUTO: 28.9 PG (ref 26–34)
MCHC RBC AUTO-ENTMCNC: 33 G/DL (ref 31–37)
MCV RBC AUTO: 87.6 FL (ref 80–100)
MONOCYTES # BLD AUTO: 0.75 X10(3) UL (ref 0.1–1)
MONOCYTES NFR BLD AUTO: 8.3 %
MRSA DNA SPEC QL NAA+PROBE: NEGATIVE
NEUTROPHILS # BLD AUTO: 7.24 X10 (3) UL (ref 1.5–7.7)
NEUTROPHILS # BLD AUTO: 7.24 X10(3) UL (ref 1.5–7.7)
NEUTROPHILS NFR BLD AUTO: 80.6 %
OSMOLALITY SERPL CALC.SUM OF ELEC: 292 MOSM/KG (ref 275–295)
PLATELET # BLD AUTO: 159 10(3)UL (ref 150–450)
POTASSIUM SERPL-SCNC: 3.9 MMOL/L (ref 3.5–5.1)
RBC # BLD AUTO: 4.74 X10(6)UL (ref 4.3–5.7)
SODIUM SERPL-SCNC: 139 MMOL/L (ref 136–145)
TROPONIN I SERPL-MCNC: 7.48 NG/ML (ref ?–0.04)
WBC # BLD AUTO: 9 X10(3) UL (ref 4–11)

## 2019-07-14 PROCEDURE — 99232 SBSQ HOSP IP/OBS MODERATE 35: CPT | Performed by: INTERNAL MEDICINE

## 2019-07-14 PROCEDURE — 99233 SBSQ HOSP IP/OBS HIGH 50: CPT | Performed by: HOSPITALIST

## 2019-07-14 RX ORDER — METOPROLOL TARTRATE 50 MG/1
50 TABLET, FILM COATED ORAL
Status: DISCONTINUED | OUTPATIENT
Start: 2019-07-14 | End: 2019-07-15

## 2019-07-14 NOTE — CONSULTS
[facility]    Report of Consultation    Armin Anthonyabdulaziz Patient Status:  Emergency    1955 MRN [id number]   Location [facility] Attending No att. providers found   Hosp Day # 0 PCP Maryam Weir MD     Willy rate 18, height 185.4 cm (6' 1\"), weight 220 lb (99.8 kg), SpO2 96 %.   Temp (24hrs), Av.2 °F (36.8 °C), Min:98.2 °F (36.8 °C), Max:98.2 °F (36.8 °C)    Wt Readings from Last 3 Encounters:  19 : 220 lb (99.8 kg)  19 : 241 lb (109.3 kg)

## 2019-07-14 NOTE — ED INITIAL ASSESSMENT (HPI)
Pt c/o intermittent chest pain that started this AM while he was at work, states that he was just sitting at his desk when it started. Denies any ALIYAH except when pain becomes intense. He states he has a hx of diabetes and HTN.

## 2019-07-14 NOTE — RESPIRATORY THERAPY NOTE
CARLOS ASSESSMENT:    Pt does not have a previous diagnosis of CARLOS. Pt does not routinely use a CPAP device at home. This pt is not suspected to be at high risk for CARLOS and sleep lab packet was not provided to patient for outpatient follow-up.     Patient stat

## 2019-07-14 NOTE — H&P
[address] Patient Status:  Emergency    1955 MRN [id number]   Location [address] Attending Lorena Garcia MD   Hosp Day # 0 PCP Natalie Clemens MD     Date of Admission admission)    Allergies  No Known Allergies    Review of Systems:   Constitutional: denies fevers, chills, weakness, fatigue, recent illness  HEENT: denies headache, sore throat, vision loss  Cardio: chest pain, chest pressure, denies palpitations  Respira technique and hypoinflated lungs. Increased interstitial markings in the left lung noted. GILMAR/MEDIAST:   Fullness of the perihilar vasculature. LUNGS: Hypoinflated lungs. Mild left basilar airspace disease. Eventration of the right hemidiaphragm.  PLEUR

## 2019-07-14 NOTE — PROGRESS NOTES
S: feels well     O:   Blood pressure 132/83, pulse 75, temperature 97 °F (36.1 °C), temperature source Temporal, resp.  rate 22, height 185.4 cm (6' 1\"), weight 220 lb (99.8 kg), SpO2 97 %.       07/14/19  0300 07/14/19  0400 07/14/19  0500 07/14/19  0600 25 mg Oral 2x Daily(Beta Blocker)           Patient Active Problem List:     Type 2 diabetes mellitus ([facility] [address]     Dyslipidemia associated with type 2 diabetes mellitus ([facility] [address]     Essential hypertension     Diabetic nephropathy ([facility] [address]     Acute coronary syndro

## 2019-07-14 NOTE — PROGRESS NOTES
[place] [facility] - [facility]    Progress Note    Lennox Hawthorne Patient Status:  Inpatient    1955 MRN [id number]   Location [facility] 2W/SW Attending Dorinda Rizvi Day # 1 PCP Rupinder Howell MD        Subjective:     Constitut K 3.9 07/14/2019     07/14/2019    CO2 25.0 07/14/2019     (H) 07/14/2019    CA 7.9 (L) 07/14/2019    ALB 3.9 07/13/2019    ALKPHO 72 07/13/2019    BILT 0.6 07/13/2019    TP 7.0 07/13/2019    AST 33 07/13/2019    ALT 38 07/13/2019    PTT 21.1

## 2019-07-14 NOTE — PROGRESS NOTES
Late entry. Received patient from cath lab. Alert and oriented x 4. femstop to rt groin. Rt pedal pulse palpable 1+ . Pt denies chest pain. Skin assessed with CATH lab RN, rt groin with bruising/ ecchymosis, no hematoma.  Order to leave femstop on for 2 hrs

## 2019-07-14 NOTE — PROCEDURES
[facility]    PATIENT'S NAME: Iliana Vidal   ATTENDING PHYSICIAN: Margo Kennedy DO   OPERATING PHYSICIAN: Nina Mueller MD   PATIENT ACCOUNT#:   [id number]    LOCATION:  [address] [place] [address] RECORD #:   [id number]       DATE OF BIRTH: artery. Despite the Angio-Seal, the vessel continued to ooze. I held the vessel manually for approximately 1/2 of an hour, and also instilled 10 mL of subcutaneous lidocaine with epinephrine.   We placed a FemoStop, and when the patient left the laborator patient's presentation, and thus we approached that vessel first using a 6-Upper sorbian JR4 guide and a 0.014 Balance middleweight wire. Angiomax was given for systemic anticoagulation and Brilinta for antiplatelet therapy.   The patient had been treated in the described above. The patient now has 2 drug-eluting stents in his coronary circulation. This necessitates the ongoing use of aspirin and Brilinta to prevent subacute stent thrombosis.   These agents should not be discontinued for any reason unless I a

## 2019-07-14 NOTE — PROGRESS NOTES
Prelim angio    EDP 7    Posterobasal and diaphragmatic akinesia -- EF 40-45% with mild MR    LM okay  LAD diffuse angiographically non-obstructive disease - long 70-80% lesion distally as wraps around apex  LCX 90 eccentric and irregular proximal stenosis

## 2019-07-14 NOTE — PLAN OF CARE
Problem: Patient Centered Care  Goal: Patient preferences are identified and integrated in the patient's plan of care  Description  Interventions:  - Provide timely, complete, and accurate information to patient/family  - Incorporate patient and family k electrolytes and administer replacement therapy as ordered  Outcome: Progressing   VSS. No complaints of pain. R groin dressing dry, intact, old drainage and bruising. Soft, no hematoma. Will continue to monitor.

## 2019-07-14 NOTE — ED PROVIDER NOTES
Patient Seen in: [facility] AND [facility] Emergency Department    History   Patient presents with:  Chest Pain Angina (cardiovascular)    Stated Complaint:  Chest pain    HPI    61 yo M with PMH DM, HTN, HL presenting for evaluation of anterior sharp/stabbing fernando Details unknown   • Dementia Mother    • Hypertension Mother    • Heart Disease Father          MI age 61   • Colon Cancer Paternal Grandfather    • Heart Disease Paternal Uncle          MI age 61       Social History    Tobacco Use      Smoking st other components within normal limits   LIPID PANEL - Abnormal; Notable for the following components:    HDL Cholesterol 38 (*)     Triglycerides 415 (*)     Non HDL Chol 154 (*)     All other components within normal limits   DIRECT LDL - Abnormal; Notabl Eventration of the right hemidiaphragm. PLEURA: No significant effusion on a single view. No pneumothorax. BONES: Multilevel degenerative changes are present. CONCLUSION:   Mild left basilar airspace disease, favor atelectasis.    Slightly asymmetr to his acute coronary syndrome. This involved direct patient intervention, complex decision making, and/or extensive discussions with the patient, family, and clinical staff.     Disposition and Plan     Clinical Impression:  Acute coronary syndrome ([address]

## 2019-07-14 NOTE — PROGRESS NOTES
01:00 femostop discontinued. Site dressed with a tegadem. ecchymosis present. Site soft and without hematoma.  Will continue to monitor

## 2019-07-14 NOTE — PLAN OF CARE
Problem: Patient Centered Care  Goal: Patient preferences are identified and integrated in the patient's plan of care  Description  Interventions:  - What would you like us to know as we care for you?   - Provide timely, complete, and accurate informatio emergency measures for life threatening arrhythmias  - Monitor electrolytes and administer replacement therapy as ordered  Outcome: Progressing

## 2019-07-14 NOTE — PROGRESS NOTES
[facility]     Progress Note        Tanya Dewey Patient Status:  Inpatient    1955 MRN [id number]   Location [facility] 2W/SW Attending Deepthi Rawls DO   Hosp Day # 1 PCP Suzanna Knight MD       Subjective:   Patient seen deficits  Skin: warm, dry      Results:     Lab Results   Component Value Date    WBC 9.0 07/14/2019    HGB 13.7 07/14/2019    HCT 41.5 07/14/2019    .0 07/14/2019    CREATSERUM 0.85 07/14/2019    BUN 12 07/14/2019     07/14/2019    K 3.9 07/1

## 2019-07-15 ENCOUNTER — APPOINTMENT (OUTPATIENT)
Dept: INTERVENTIONAL RADIOLOGY/VASCULAR | Facility: HOSPITAL | Age: 64
DRG: 247 | End: 2019-07-15
Attending: INTERNAL MEDICINE
Payer: COMMERCIAL

## 2019-07-15 ENCOUNTER — HOSPITAL SCAN (OUTPATIENT)
Dept: HEALTH INFORMATION MANAGEMENT | Facility: OTHER | Age: 64
End: 2019-07-15

## 2019-07-15 VITALS
DIASTOLIC BLOOD PRESSURE: 86 MMHG | TEMPERATURE: 98 F | BODY MASS INDEX: 30.62 KG/M2 | HEIGHT: 73 IN | WEIGHT: 231 LBS | HEART RATE: 72 BPM | SYSTOLIC BLOOD PRESSURE: 125 MMHG | OXYGEN SATURATION: 96 % | RESPIRATION RATE: 20 BRPM

## 2019-07-15 LAB
ABSOLUTE IMMATURE GRANULOCYTES (OFFPRE24): NORMAL
ANION GAP SERPL CALC-SCNC: 6 MMOL/L
ANION GAP SERPL CALC-SCNC: 6 MMOL/L (ref 0–18)
BASO+EOS+MONOS # BLD: NORMAL 10*3/UL
BASO+EOS+MONOS NFR BLD: NORMAL %
BASOPHILS # BLD AUTO: 0.01 X10(3) UL (ref 0–0.2)
BASOPHILS # BLD: NORMAL 10*3/UL
BASOPHILS NFR BLD AUTO: 0.2 %
BASOPHILS NFR BLD: NORMAL %
BUN BLD-MCNC: 12 MG/DL (ref 7–18)
BUN SERPL-MCNC: 12 MG/DL
BUN/CREAT SERPL: 11.9
BUN/CREAT SERPL: 11.9 (ref 10–20)
CALCIUM BLD-MCNC: 8.2 MG/DL (ref 8.5–10.1)
CALCIUM SERPL-MCNC: 8.2 MG/DL
CHLORIDE SERPL-SCNC: 108 MMOL/L
CHLORIDE SERPL-SCNC: 108 MMOL/L (ref 98–112)
CO2 SERPL-SCNC: 27 MMOL/L
CO2 SERPL-SCNC: 27 MMOL/L (ref 21–32)
CREAT BLD-MCNC: 1.01 MG/DL (ref 0.7–1.3)
CREAT SERPL-MCNC: 1.01 MG/DL
DEPRECATED RDW RBC AUTO: 42 FL (ref 35.1–46.3)
DIFFERENTIAL METHOD BLD: NORMAL
EOSINOPHIL # BLD AUTO: 0.12 X10(3) UL (ref 0–0.7)
EOSINOPHIL # BLD: NORMAL 10*3/UL
EOSINOPHIL NFR BLD AUTO: 1.8 %
EOSINOPHIL NFR BLD: NORMAL %
ERYTHROCYTE [DISTWIDTH] IN BLOOD BY AUTOMATED COUNT: 13.3 % (ref 11–15)
ERYTHROCYTE [DISTWIDTH] IN BLOOD: NORMAL %
GLUCOSE BLD-MCNC: 149 MG/DL (ref 70–99)
GLUCOSE BLDC GLUCOMTR-MCNC: 150 MG/DL (ref 70–99)
GLUCOSE BLDC GLUCOMTR-MCNC: 159 MG/DL (ref 70–99)
GLUCOSE SERPL-MCNC: 149 MG/DL
HAV IGM SER QL: 2.3 MG/DL (ref 1.6–2.6)
HCT VFR BLD AUTO: 41.5 % (ref 39–53)
HCT VFR BLD CALC: 41.5 %
HGB BLD-MCNC: 13.5 G/DL
HGB BLD-MCNC: 13.5 G/DL (ref 13–17.5)
IMM GRANULOCYTES # BLD AUTO: 0.02 X10(3) UL (ref 0–1)
IMM GRANULOCYTES NFR BLD: 0.3 %
IMMATURE GRANULOCYTES (OFFPRE25): NORMAL
ISTAT ACTIVATED CLOTTING TIME: 318 SECONDS (ref 125–137)
LENGTH OF FAST TIME PATIENT: NORMAL H
LYMPHOCYTES # BLD AUTO: 1.01 X10(3) UL (ref 1–4)
LYMPHOCYTES # BLD: NORMAL 10*3/UL
LYMPHOCYTES NFR BLD AUTO: 15.2 %
LYMPHOCYTES NFR BLD: NORMAL %
MCH RBC QN AUTO: 28.3 PG (ref 26–34)
MCH RBC QN AUTO: NORMAL PG
MCHC RBC AUTO-ENTMCNC: 32.5 G/DL (ref 31–37)
MCHC RBC AUTO-ENTMCNC: NORMAL G/DL
MCV RBC AUTO: 87 FL (ref 80–100)
MCV RBC AUTO: NORMAL FL
MONOCYTES # BLD AUTO: 0.81 X10(3) UL (ref 0.1–1)
MONOCYTES # BLD: NORMAL 10*3/UL
MONOCYTES NFR BLD AUTO: 12.2 %
MONOCYTES NFR BLD: NORMAL %
MPV (OFFPRE2): NORMAL
NEUTROPHILS # BLD AUTO: 4.66 X10 (3) UL (ref 1.5–7.7)
NEUTROPHILS # BLD AUTO: 4.66 X10(3) UL (ref 1.5–7.7)
NEUTROPHILS # BLD: NORMAL 10*3/UL
NEUTROPHILS NFR BLD AUTO: 70.3 %
NEUTROPHILS NFR BLD: NORMAL %
NRBC BLD MANUAL-RTO: NORMAL %
OSMOLALITY SERPL CALC.SUM OF ELEC: 295 MOSM/KG (ref 275–295)
PHOSPHATE SERPL-MCNC: 2.8 MG/DL (ref 2.5–4.9)
PLAT MORPH BLD: NORMAL
PLATELET # BLD AUTO: 153 10(3)UL (ref 150–450)
PLATELET # BLD: 153 10*3/UL
POTASSIUM SERPL-SCNC: 4 MMOL/L
POTASSIUM SERPL-SCNC: 4 MMOL/L (ref 3.5–5.1)
RBC # BLD AUTO: 4.77 X10(6)UL (ref 4.3–5.7)
RBC # BLD: 4.77 10*6/UL
RBC MORPH BLD: NORMAL
SODIUM SERPL-SCNC: 141 MMOL/L
SODIUM SERPL-SCNC: 141 MMOL/L (ref 136–145)
WBC # BLD AUTO: 6.6 X10(3) UL (ref 4–11)
WBC # BLD: 6.6 10*3/UL
WBC MORPH BLD: NORMAL

## 2019-07-15 PROCEDURE — 99232 SBSQ HOSP IP/OBS MODERATE 35: CPT | Performed by: INTERNAL MEDICINE

## 2019-07-15 PROCEDURE — 99239 HOSP IP/OBS DSCHRG MGMT >30: CPT | Performed by: HOSPITALIST

## 2019-07-15 PROCEDURE — 027034Z DILATION OF CORONARY ARTERY, ONE ARTERY WITH DRUG-ELUTING INTRALUMINAL DEVICE, PERCUTANEOUS APPROACH: ICD-10-PCS | Performed by: INTERNAL MEDICINE

## 2019-07-15 RX ORDER — ASPIRIN 81 MG/1
81 TABLET ORAL DAILY
Status: DISCONTINUED | OUTPATIENT
Start: 2019-07-16 | End: 2019-07-15

## 2019-07-15 RX ORDER — LOSARTAN POTASSIUM 50 MG/1
50 TABLET ORAL DAILY
Qty: 30 TABLET | Refills: 0 | Status: SHIPPED | OUTPATIENT
Start: 2019-07-16 | End: 2019-07-31

## 2019-07-15 RX ORDER — OMEPRAZOLE 20 MG/1
40 CAPSULE, DELAYED RELEASE ORAL
Status: ON HOLD | COMMUNITY
End: 2019-07-15

## 2019-07-15 RX ORDER — VERAPAMIL HYDROCHLORIDE 2.5 MG/ML
INJECTION, SOLUTION INTRAVENOUS
Status: COMPLETED
Start: 2019-07-15 | End: 2019-07-15

## 2019-07-15 RX ORDER — SPIRONOLACTONE 25 MG/1
25 TABLET ORAL DAILY
Status: ON HOLD | COMMUNITY
End: 2019-07-15

## 2019-07-15 RX ORDER — DIGOXIN 0.05 MG/ML
250 SOLUTION ORAL DAILY
Status: ON HOLD | COMMUNITY
End: 2019-07-15

## 2019-07-15 RX ORDER — CHLORHEXIDINE GLUCONATE 4 G/100ML
30 SOLUTION TOPICAL
Status: CANCELLED | OUTPATIENT
Start: 2019-07-15 | End: 2019-07-15

## 2019-07-15 RX ORDER — ASPIRIN 81 MG/1
81 TABLET, CHEWABLE ORAL DAILY
Status: ON HOLD | COMMUNITY
End: 2019-07-15

## 2019-07-15 RX ORDER — CARVEDILOL 25 MG/1
25 TABLET ORAL 2 TIMES DAILY WITH MEALS
Status: ON HOLD | COMMUNITY
End: 2019-07-15

## 2019-07-15 RX ORDER — ATORVASTATIN CALCIUM 80 MG/1
80 TABLET, FILM COATED ORAL NIGHTLY
Qty: 30 TABLET | Refills: 0 | Status: SHIPPED | OUTPATIENT
Start: 2019-07-15 | End: 2019-07-31

## 2019-07-15 RX ORDER — HEPARIN SODIUM 1000 [USP'U]/ML
INJECTION, SOLUTION INTRAVENOUS; SUBCUTANEOUS
Status: COMPLETED
Start: 2019-07-15 | End: 2019-07-15

## 2019-07-15 RX ORDER — BUMETANIDE 1 MG/1
1 TABLET ORAL DAILY
Status: ON HOLD | COMMUNITY
End: 2019-07-15

## 2019-07-15 RX ORDER — GLIPIZIDE 5 MG/1
5 TABLET ORAL
Status: ON HOLD | COMMUNITY
End: 2019-07-15

## 2019-07-15 RX ORDER — ASPIRIN 81 MG/1
81 TABLET ORAL DAILY
Qty: 30 TABLET | Refills: 0 | Status: SHIPPED | OUTPATIENT
Start: 2019-07-16 | End: 2019-07-31

## 2019-07-15 RX ORDER — SODIUM CHLORIDE 9 MG/ML
INJECTION, SOLUTION INTRAVENOUS
Status: CANCELLED | OUTPATIENT
Start: 2019-07-15 | End: 2019-07-15

## 2019-07-15 RX ORDER — SUCRALFATE 1 G/1
1 TABLET ORAL
Status: ON HOLD | COMMUNITY
End: 2019-07-15

## 2019-07-15 RX ORDER — MIDAZOLAM HYDROCHLORIDE 1 MG/ML
INJECTION INTRAMUSCULAR; INTRAVENOUS
Status: COMPLETED
Start: 2019-07-15 | End: 2019-07-15

## 2019-07-15 RX ORDER — SODIUM CHLORIDE 9 MG/ML
INJECTION, SOLUTION INTRAVENOUS
Status: COMPLETED
Start: 2019-07-15 | End: 2019-07-15

## 2019-07-15 RX ORDER — LIDOCAINE HYDROCHLORIDE 20 MG/ML
INJECTION, SOLUTION EPIDURAL; INFILTRATION; INTRACAUDAL; PERINEURAL
Status: COMPLETED
Start: 2019-07-15 | End: 2019-07-15

## 2019-07-15 RX ORDER — COLCHICINE 0.6 MG/1
0.6 TABLET ORAL DAILY
Status: ON HOLD | COMMUNITY
End: 2019-07-15

## 2019-07-15 RX ORDER — NITROGLYCERIN 20 MG/100ML
INJECTION INTRAVENOUS
Status: COMPLETED
Start: 2019-07-15 | End: 2019-07-15

## 2019-07-15 RX ORDER — POTASSIUM CHLORIDE 1500 MG/1
20 TABLET, FILM COATED, EXTENDED RELEASE ORAL DAILY
Status: ON HOLD | COMMUNITY
End: 2019-07-15

## 2019-07-15 RX ORDER — ALLOPURINOL 100 MG/1
100 TABLET ORAL 2 TIMES DAILY
Status: ON HOLD | COMMUNITY
End: 2019-07-15

## 2019-07-15 RX ORDER — METOPROLOL TARTRATE 50 MG/1
50 TABLET, FILM COATED ORAL
Qty: 60 TABLET | Refills: 0 | Status: SHIPPED | OUTPATIENT
Start: 2019-07-15 | End: 2019-07-31

## 2019-07-15 RX ORDER — SODIUM CHLORIDE 9 MG/ML
INJECTION, SOLUTION INTRAVENOUS CONTINUOUS
Status: ACTIVE | OUTPATIENT
Start: 2019-07-15 | End: 2019-07-15

## 2019-07-15 RX ORDER — ATORVASTATIN CALCIUM 40 MG/1
40 TABLET, FILM COATED ORAL NIGHTLY
Status: ON HOLD | COMMUNITY
End: 2019-07-15

## 2019-07-15 NOTE — PROCEDURES
[facility]OSP - [facility]    [facility] Cardiac Cath Procedure Note  Ramos Hernandez Patient Status:  Inpatient    1955 MRN [id number]   Location [facility] Attending Armando Bruce,*   Hosp Day # 2 PCP Beatriz Pack oxygen, heart rate and blood pressure by nurse and myself during the exam 30 minutes.       Renaldo Cranker, MD  07/15/19

## 2019-07-15 NOTE — IVS NOTE
Albino Damon  [id number]  7/15/2019    Post procedure/ recovery hand-off report given to [facility] Patient's vital signs stable, access site dry and intact, no signs and symptoms of bleeding/ hematoma.     Daria Coy RN

## 2019-07-15 NOTE — PLAN OF CARE
Problem: Patient Centered Care  Goal: Patient preferences are identified and integrated in the patient's plan of care  Description  Interventions:  - What would you like us to know as we care for you?   - Provide timely, complete, and accurate informatio control medications as ordered  - Initiate emergency measures for life threatening arrhythmias  - Monitor electrolytes and administer replacement therapy as ordered  Outcome: Adequate for Discharge   DISCHARGE ORDERS NOTED AND REVIEWED WITH PT  PT VOICED U

## 2019-07-15 NOTE — PROGRESS NOTES
[place] [facility] - [facility]    Progress Note    Tressayanna Rubio Patient Status:  Inpatient    1955 MRN [id number]   Location [facility] 3W/SW Attending Dorinda Rizvi Day # 2 PCP Sergio Melendez MD        Subjective:     Constitut (cpt=71045)    Result Date: 7/13/2019  CONCLUSION:   Mild left basilar airspace disease, favor atelectasis. Slightly asymmetric interstitial markings in the left lung which may reflect asymmetric pulmonary vascular congestion or artifact.   As the lungs a block and anterior fascicular block. Voltage criteria for LVH met. -ST depression + Nonspecific T-abnormality -Seen with left ventricular hypertrophy (strain) -consider ischemia.  ABNORMAL No previous ECGs available Electronically signed on 07/14/2019 at

## 2019-07-15 NOTE — DISCHARGE SUMMARY
Dc summary#[id number]  > 30 min spent on [address] Discharge Diagnoses: nstemi    Lace+ Score: 43  59-90 High Risk  29-58 Medium Risk  0-28   Low Risk.     TCM Follow-Up Recommendation:  LACE < 29: Low Risk of readmission after discharge from the [facility]

## 2019-07-15 NOTE — PLAN OF CARE
Discussed catheterization procedure with the patient . The risk, benefits and alternatives of coronary angiogram with percutaneous intervention were explained to the patient.  Including but not limited to 1-3% risk of stroke, death, heart attack, coronary d

## 2019-07-16 ENCOUNTER — PATIENT OUTREACH (OUTPATIENT)
Dept: CASE MANAGEMENT | Age: 64
End: 2019-07-16

## 2019-07-16 ENCOUNTER — TELEPHONE (OUTPATIENT)
Dept: INTERNAL MEDICINE CLINIC | Facility: CLINIC | Age: 64
End: 2019-07-16

## 2019-07-16 ENCOUNTER — TELEPHONE (OUTPATIENT)
Dept: CASE MANAGEMENT | Age: 64
End: 2019-07-16

## 2019-07-16 ENCOUNTER — TELEPHONE (OUTPATIENT)
Dept: CARDIOLOGY | Age: 64
End: 2019-07-16

## 2019-07-16 DIAGNOSIS — Z02.9 ENCOUNTERS FOR ADMINISTRATIVE PURPOSE: ICD-10-CM

## 2019-07-16 DIAGNOSIS — I21.4 ACUTE NON-ST ELEVATION MYOCARDIAL INFARCTION (NSTEMI) (HCC): ICD-10-CM

## 2019-07-16 PROCEDURE — 1111F DSCHRG MED/CURRENT MED MERGE: CPT

## 2019-07-16 NOTE — TELEPHONE ENCOUNTER
Pt has apt to see Dr Beverly Hernandez on 7/17/19 in 20 min slot. TCM RN outreach complete. Offered to reschedule apt for TCM slot or extend current appt. Pt declined and requested to leave apt in 20 min slot as is. States he can not come any earlier.  (Pt states Dr Thai Hinkle

## 2019-07-16 NOTE — PROGRESS NOTES
Initial Post Discharge Follow Up   Discharge Date: 7/15/19  Contact Date: 7/16/2019    Consent Verification:  Assessment Completed With: Patient  HIPAA Verified? Yes    Discharge Dx:   Non-ST elevation myocardial infarction.      General:   • How have yo Does not apply Kit Check blood glucose daily,E11.9 Disp: 1 kit Rfl: 0   Lancets Does not apply Misc Check blood glucose daily,E11.9 Disp: 200 each Rfl: 1     • When you were leaving the hospital were any medication changes discussed with you? yes  • If you [address]  [address] [facility]  [address] [facility] [address] [phone]          PCP TCM/HFU appointment: scheduled at D/C within 7-14 days  yes     NCM Reviewed/scheduled/rescheduled PCP TCM/HFU appointment with pt:   Yes

## 2019-07-16 NOTE — DISCHARGE SUMMARY
[facility]    PATIENT'S NAME: SHELBY DAVILA   ATTENDING PHYSICIAN: Deirdre Rizvi MD   PATIENT ACCOUNT#:   [id number]    LOCATION:  [address] [place] [address] RECORD #:   [id number]       YOB: 1955  ADMISSION DATE:       07/13/2019 low salt, low cholesterol, 1800 calorie ADA. ACTIVITY:  No heavy exercise. Otherwise, as tolerated. FOLLOWUP:  Dr. Kalpana Perez in 3 days, Cardiology as they see fit. Return if chest pain, fevers, shortness of breath, or any other complaints.      [address] [place]

## 2019-07-17 ENCOUNTER — TELEPHONE (OUTPATIENT)
Dept: CARDIOLOGY UNIT | Facility: HOSPITAL | Age: 64
End: 2019-07-17

## 2019-07-17 ENCOUNTER — OFFICE VISIT (OUTPATIENT)
Dept: INTERNAL MEDICINE CLINIC | Facility: CLINIC | Age: 64
End: 2019-07-17

## 2019-07-17 VITALS
SYSTOLIC BLOOD PRESSURE: 112 MMHG | BODY MASS INDEX: 30.09 KG/M2 | HEIGHT: 73.5 IN | WEIGHT: 232 LBS | HEART RATE: 83 BPM | DIASTOLIC BLOOD PRESSURE: 72 MMHG

## 2019-07-17 DIAGNOSIS — E11.69 DYSLIPIDEMIA ASSOCIATED WITH TYPE 2 DIABETES MELLITUS (HCC): ICD-10-CM

## 2019-07-17 DIAGNOSIS — E78.5 DYSLIPIDEMIA ASSOCIATED WITH TYPE 2 DIABETES MELLITUS (HCC): ICD-10-CM

## 2019-07-17 DIAGNOSIS — I10 ESSENTIAL HYPERTENSION: ICD-10-CM

## 2019-07-17 DIAGNOSIS — I25.10 ASHD (ARTERIOSCLEROTIC HEART DISEASE): ICD-10-CM

## 2019-07-17 DIAGNOSIS — E11.29 TYPE 2 DIABETES MELLITUS WITH MICROALBUMINURIA, WITHOUT LONG-TERM CURRENT USE OF INSULIN (HCC): ICD-10-CM

## 2019-07-17 DIAGNOSIS — I21.4 ACUTE NON-ST ELEVATION MYOCARDIAL INFARCTION (NSTEMI) (HCC): Primary | ICD-10-CM

## 2019-07-17 DIAGNOSIS — R80.9 TYPE 2 DIABETES MELLITUS WITH MICROALBUMINURIA, WITHOUT LONG-TERM CURRENT USE OF INSULIN (HCC): ICD-10-CM

## 2019-07-17 PROCEDURE — 99495 TRANSJ CARE MGMT MOD F2F 14D: CPT | Performed by: INTERNAL MEDICINE

## 2019-07-17 NOTE — PATIENT INSTRUCTIONS
Continue current medications. Follow-up as planned with Cardiology next week. Follow a healthy diet. Return visit in 1 month.

## 2019-07-18 ENCOUNTER — TELEPHONE (OUTPATIENT)
Dept: INTERNAL MEDICINE CLINIC | Facility: CLINIC | Age: 64
End: 2019-07-18

## 2019-07-18 NOTE — TELEPHONE ENCOUNTER
Spouse states that the patient saw Dr. Noemi Cope yesterday and is unclear and to who will be releasing the patient to return to work. Will it be Dr. Noemi Cope or the cardiologist? Spouse, would like a call back today.

## 2019-07-20 ENCOUNTER — TELEPHONE (OUTPATIENT)
Dept: CARDIOLOGY UNIT | Facility: HOSPITAL | Age: 64
End: 2019-07-20

## 2019-07-22 ENCOUNTER — TELEPHONE (OUTPATIENT)
Dept: INTERNAL MEDICINE CLINIC | Facility: CLINIC | Age: 64
End: 2019-07-22

## 2019-07-22 DIAGNOSIS — I24.9 ACUTE CORONARY SYNDROME (HCC): Primary | ICD-10-CM

## 2019-07-22 PROBLEM — I21.4 ACUTE NON-ST ELEVATION MYOCARDIAL INFARCTION (NSTEMI) (CMD): Status: ACTIVE | Noted: 2019-07-22

## 2019-07-22 PROBLEM — I10 ESSENTIAL HYPERTENSION: Status: ACTIVE | Noted: 2019-07-22

## 2019-07-22 PROBLEM — I25.10 ASHD (ARTERIOSCLEROTIC HEART DISEASE): Status: ACTIVE | Noted: 2019-07-22

## 2019-07-22 RX ORDER — METOPROLOL TARTRATE 50 MG/1
50 TABLET, FILM COATED ORAL 2 TIMES DAILY
COMMUNITY
Start: 2019-07-15

## 2019-07-22 RX ORDER — LANCETS 30 GAUGE
EACH MISCELLANEOUS
COMMUNITY
Start: 2017-01-30

## 2019-07-22 RX ORDER — ASPIRIN 81 MG/1
81 TABLET ORAL DAILY
COMMUNITY
Start: 2019-07-16

## 2019-07-22 RX ORDER — LOSARTAN POTASSIUM 50 MG/1
50 TABLET ORAL DAILY
COMMUNITY
Start: 2019-07-16

## 2019-07-22 RX ORDER — ATORVASTATIN CALCIUM 80 MG/1
80 TABLET, FILM COATED ORAL NIGHTLY
COMMUNITY
Start: 2019-07-15

## 2019-07-22 NOTE — TELEPHONE ENCOUNTER
My apologies, I see that patient sees Dr Sheri Alvarado, please have front staff change that information in chart. I will route message to Dr. Sheri Alvarado.

## 2019-07-22 NOTE — TELEPHONE ENCOUNTER
Patient called and requested referral to Dr Frankey Ogle (Interventional Cardiology) for acute coronary syndrome. Please sign off if agree.   Thank you,  [address]

## 2019-07-24 ENCOUNTER — TELEPHONE (OUTPATIENT)
Dept: INTERNAL MEDICINE CLINIC | Facility: CLINIC | Age: 64
End: 2019-07-24

## 2019-07-24 ENCOUNTER — OFFICE VISIT (OUTPATIENT)
Dept: CARDIOLOGY | Age: 64
End: 2019-07-24

## 2019-07-24 VITALS
BODY MASS INDEX: 28.88 KG/M2 | HEART RATE: 76 BPM | WEIGHT: 225 LBS | OXYGEN SATURATION: 98 % | SYSTOLIC BLOOD PRESSURE: 122 MMHG | HEIGHT: 74 IN | DIASTOLIC BLOOD PRESSURE: 62 MMHG

## 2019-07-24 DIAGNOSIS — I10 ESSENTIAL HYPERTENSION: Primary | ICD-10-CM

## 2019-07-24 DIAGNOSIS — I25.10 CORONARY ARTERY DISEASE INVOLVING NATIVE CORONARY ARTERY OF NATIVE HEART WITHOUT ANGINA PECTORIS: ICD-10-CM

## 2019-07-24 PROBLEM — I21.4 ACUTE NON-ST ELEVATION MYOCARDIAL INFARCTION (NSTEMI) (CMD): Status: RESOLVED | Noted: 2019-07-22 | Resolved: 2019-07-24

## 2019-07-24 PROBLEM — I24.9 ACUTE CORONARY SYNDROME (CMD): Status: RESOLVED | Noted: 2019-07-22 | Resolved: 2019-07-24

## 2019-07-24 PROCEDURE — 99214 OFFICE O/P EST MOD 30 MIN: CPT | Performed by: INTERNAL MEDICINE

## 2019-07-24 PROCEDURE — 3078F DIAST BP <80 MM HG: CPT | Performed by: INTERNAL MEDICINE

## 2019-07-24 PROCEDURE — 3074F SYST BP LT 130 MM HG: CPT | Performed by: INTERNAL MEDICINE

## 2019-07-24 SDOH — HEALTH STABILITY: MENTAL HEALTH: HOW OFTEN DO YOU HAVE A DRINK CONTAINING ALCOHOL?: NEVER

## 2019-07-24 ASSESSMENT — PATIENT HEALTH QUESTIONNAIRE - PHQ9
1. LITTLE INTEREST OR PLEASURE IN DOING THINGS: NOT AT ALL
SUM OF ALL RESPONSES TO PHQ9 QUESTIONS 1 AND 2: 0
2. FEELING DOWN, DEPRESSED OR HOPELESS: NOT AT ALL
SUM OF ALL RESPONSES TO PHQ9 QUESTIONS 1 AND 2: 0

## 2019-07-24 NOTE — TELEPHONE ENCOUNTER
Pt's spouse called in requesting a referral for pt to start Cardiac Rehab as requested by Dr. Halle Morton.   Please advise

## 2019-07-25 NOTE — TELEPHONE ENCOUNTER
Called patient on both numbers could not leave msg, will try to reach patient at a later time. FYI is  can not release info to spouse unless verbal obtained by patient.

## 2019-07-30 ENCOUNTER — TELEPHONE (OUTPATIENT)
Dept: INTERNAL MEDICINE CLINIC | Facility: CLINIC | Age: 64
End: 2019-07-30

## 2019-07-30 DIAGNOSIS — I21.9 ACUTE MYOCARDIAL INFARCTION, UNSPECIFIED MI TYPE, UNSPECIFIED ARTERY (HCC): Primary | ICD-10-CM

## 2019-07-30 NOTE — TELEPHONE ENCOUNTER
Patient's wife Mariah Cummings) called requesting referral for Cardiac rehab for patient. Informed patient that I would need to check with PCP if this is needed and also needed to know who the ordering physician is. Please advise.     Thank you,  [address]

## 2019-07-30 NOTE — TELEPHONE ENCOUNTER
Dr Serene Baker,  Please have your  change PCP. Also, please see referral, sign if agree.     Thank you,  STORMY NEGRON [facility]

## 2019-07-31 ENCOUNTER — TELEPHONE (OUTPATIENT)
Dept: CARDIOLOGY | Age: 64
End: 2019-07-31

## 2019-07-31 RX ORDER — ASPIRIN 81 MG/1
81 TABLET ORAL DAILY
Qty: 30 TABLET | Refills: 5 | Status: SHIPPED | OUTPATIENT
Start: 2019-07-31

## 2019-07-31 RX ORDER — METOPROLOL TARTRATE 50 MG/1
50 TABLET, FILM COATED ORAL
Qty: 60 TABLET | Refills: 5 | Status: SHIPPED | OUTPATIENT
Start: 2019-07-31 | End: 2019-12-06

## 2019-07-31 RX ORDER — LOSARTAN POTASSIUM 50 MG/1
50 TABLET ORAL DAILY
Qty: 30 TABLET | Refills: 5 | Status: SHIPPED | OUTPATIENT
Start: 2019-07-31 | End: 2020-02-10

## 2019-07-31 RX ORDER — ATORVASTATIN CALCIUM 80 MG/1
80 TABLET, FILM COATED ORAL NIGHTLY
Qty: 30 TABLET | Refills: 5 | Status: SHIPPED | OUTPATIENT
Start: 2019-07-31 | End: 2020-02-14

## 2019-07-31 NOTE — TELEPHONE ENCOUNTER
Pts wife called stating she would like to speak with rn pt will need refills for medication that was given during ER visit.

## 2019-07-31 NOTE — TELEPHONE ENCOUNTER
Spouse calling to state pt is due for refills of meds he received scripts for while inpatient. Pt spouse uncertain if pt is to remain on these meds. Spouse will check with cardiologist also if pt should still take ticagrelor. meds pended for review.

## 2019-08-07 ENCOUNTER — TELEPHONE (OUTPATIENT)
Dept: CARDIOLOGY | Age: 64
End: 2019-08-07

## 2019-08-07 ENCOUNTER — CARDPULM VISIT (OUTPATIENT)
Dept: CARDIAC REHAB | Facility: HOSPITAL | Age: 64
End: 2019-08-07
Attending: INTERNAL MEDICINE
Payer: COMMERCIAL

## 2019-08-07 DIAGNOSIS — I21.9 ACUTE MYOCARDIAL INFARCTION, UNSPECIFIED MI TYPE, UNSPECIFIED ARTERY (HCC): ICD-10-CM

## 2019-08-12 ENCOUNTER — CARDPULM VISIT (OUTPATIENT)
Dept: CARDIAC REHAB | Facility: HOSPITAL | Age: 64
End: 2019-08-12
Attending: INTERNAL MEDICINE
Payer: COMMERCIAL

## 2019-08-12 PROCEDURE — 93798 PHYS/QHP OP CAR RHAB W/ECG: CPT

## 2019-08-14 ENCOUNTER — CARDPULM VISIT (OUTPATIENT)
Dept: CARDIAC REHAB | Facility: HOSPITAL | Age: 64
End: 2019-08-14
Attending: INTERNAL MEDICINE
Payer: COMMERCIAL

## 2019-08-14 PROCEDURE — 93798 PHYS/QHP OP CAR RHAB W/ECG: CPT

## 2019-08-15 ENCOUNTER — TELEPHONE (OUTPATIENT)
Dept: MEDSURG UNIT | Facility: HOSPITAL | Age: 64
End: 2019-08-15

## 2019-08-19 ENCOUNTER — CARDPULM VISIT (OUTPATIENT)
Dept: CARDIAC REHAB | Facility: HOSPITAL | Age: 64
End: 2019-08-19
Attending: INTERNAL MEDICINE
Payer: COMMERCIAL

## 2019-08-19 PROCEDURE — 93798 PHYS/QHP OP CAR RHAB W/ECG: CPT

## 2019-08-21 ENCOUNTER — CARDPULM VISIT (OUTPATIENT)
Dept: CARDIAC REHAB | Facility: HOSPITAL | Age: 64
End: 2019-08-21
Attending: INTERNAL MEDICINE
Payer: COMMERCIAL

## 2019-08-21 PROCEDURE — 93798 PHYS/QHP OP CAR RHAB W/ECG: CPT

## 2019-08-23 ENCOUNTER — CARDPULM VISIT (OUTPATIENT)
Dept: CARDIAC REHAB | Facility: HOSPITAL | Age: 64
End: 2019-08-23
Attending: INTERNAL MEDICINE
Payer: COMMERCIAL

## 2019-08-23 PROCEDURE — 93798 PHYS/QHP OP CAR RHAB W/ECG: CPT

## 2019-08-26 ENCOUNTER — CARDPULM VISIT (OUTPATIENT)
Dept: CARDIAC REHAB | Facility: HOSPITAL | Age: 64
End: 2019-08-26
Attending: INTERNAL MEDICINE
Payer: COMMERCIAL

## 2019-08-26 PROCEDURE — 93798 PHYS/QHP OP CAR RHAB W/ECG: CPT

## 2019-08-30 ENCOUNTER — CARDPULM VISIT (OUTPATIENT)
Dept: CARDIAC REHAB | Facility: HOSPITAL | Age: 64
End: 2019-08-30
Attending: INTERNAL MEDICINE
Payer: COMMERCIAL

## 2019-08-30 PROCEDURE — 93798 PHYS/QHP OP CAR RHAB W/ECG: CPT

## 2019-09-04 ENCOUNTER — CARDPULM VISIT (OUTPATIENT)
Dept: CARDIAC REHAB | Facility: HOSPITAL | Age: 64
End: 2019-09-04
Attending: INTERNAL MEDICINE
Payer: COMMERCIAL

## 2019-09-04 PROCEDURE — 93798 PHYS/QHP OP CAR RHAB W/ECG: CPT

## 2019-09-06 ENCOUNTER — CARDPULM VISIT (OUTPATIENT)
Dept: CARDIAC REHAB | Facility: HOSPITAL | Age: 64
End: 2019-09-06
Attending: INTERNAL MEDICINE
Payer: COMMERCIAL

## 2019-09-06 PROCEDURE — 93798 PHYS/QHP OP CAR RHAB W/ECG: CPT

## 2019-09-09 ENCOUNTER — TELEPHONE (OUTPATIENT)
Dept: INTERNAL MEDICINE CLINIC | Facility: CLINIC | Age: 64
End: 2019-09-09

## 2019-09-09 NOTE — TELEPHONE ENCOUNTER
Pt is requesting for test stripes order. Please order because it is expensive without a prescription. Thank you.

## 2019-09-11 ENCOUNTER — CARDPULM VISIT (OUTPATIENT)
Dept: CARDIAC REHAB | Facility: HOSPITAL | Age: 64
End: 2019-09-11
Attending: INTERNAL MEDICINE
Payer: COMMERCIAL

## 2019-09-11 PROCEDURE — 93798 PHYS/QHP OP CAR RHAB W/ECG: CPT

## 2019-09-11 NOTE — TELEPHONE ENCOUNTER
Pt spouse calling back and advised per refill protocol pt due for OV. Spouse states pt had HSF 7/17/19 and was told he did not have to f/u until November. Please advise when pt to f/u in office. Per spouse pt will be out of metformin tomorrow.

## 2019-09-11 NOTE — TELEPHONE ENCOUNTER
Pts wife calling to f/up on refill request for Metformin med. Wife states that he will run out tomorrow.

## 2019-09-11 NOTE — TELEPHONE ENCOUNTER
Refill for Metformin sent. At his visit with me in July, he was advised to follow-up in 1 month in August.  He needs to schedule an appointment with me now.

## 2019-09-13 ENCOUNTER — CARDPULM VISIT (OUTPATIENT)
Dept: CARDIAC REHAB | Facility: HOSPITAL | Age: 64
End: 2019-09-13
Attending: INTERNAL MEDICINE
Payer: COMMERCIAL

## 2019-09-13 PROCEDURE — 93798 PHYS/QHP OP CAR RHAB W/ECG: CPT

## 2019-09-16 ENCOUNTER — CARDPULM VISIT (OUTPATIENT)
Dept: CARDIAC REHAB | Facility: HOSPITAL | Age: 64
End: 2019-09-16
Attending: INTERNAL MEDICINE
Payer: COMMERCIAL

## 2019-09-16 PROCEDURE — 93798 PHYS/QHP OP CAR RHAB W/ECG: CPT

## 2019-09-18 ENCOUNTER — CARDPULM VISIT (OUTPATIENT)
Dept: CARDIAC REHAB | Facility: HOSPITAL | Age: 64
End: 2019-09-18
Attending: INTERNAL MEDICINE
Payer: COMMERCIAL

## 2019-09-18 PROCEDURE — 93798 PHYS/QHP OP CAR RHAB W/ECG: CPT

## 2019-09-20 ENCOUNTER — CARDPULM VISIT (OUTPATIENT)
Dept: CARDIAC REHAB | Facility: HOSPITAL | Age: 64
End: 2019-09-20
Attending: INTERNAL MEDICINE
Payer: COMMERCIAL

## 2019-09-20 PROCEDURE — 93798 PHYS/QHP OP CAR RHAB W/ECG: CPT

## 2019-09-23 ENCOUNTER — CARDPULM VISIT (OUTPATIENT)
Dept: CARDIAC REHAB | Facility: HOSPITAL | Age: 64
End: 2019-09-23
Attending: INTERNAL MEDICINE
Payer: COMMERCIAL

## 2019-09-23 PROCEDURE — 93798 PHYS/QHP OP CAR RHAB W/ECG: CPT

## 2019-09-25 ENCOUNTER — CARDPULM VISIT (OUTPATIENT)
Dept: CARDIAC REHAB | Facility: HOSPITAL | Age: 64
End: 2019-09-25
Attending: INTERNAL MEDICINE
Payer: COMMERCIAL

## 2019-09-25 PROCEDURE — 93798 PHYS/QHP OP CAR RHAB W/ECG: CPT

## 2019-09-27 ENCOUNTER — TELEPHONE (OUTPATIENT)
Dept: CARDIOLOGY | Age: 64
End: 2019-09-27

## 2019-09-27 DIAGNOSIS — I25.10 CORONARY ARTERY DISEASE INVOLVING NATIVE CORONARY ARTERY OF NATIVE HEART WITHOUT ANGINA PECTORIS: Primary | ICD-10-CM

## 2019-10-02 ENCOUNTER — APPOINTMENT (OUTPATIENT)
Dept: CARDIAC REHAB | Facility: HOSPITAL | Age: 64
End: 2019-10-02
Attending: INTERNAL MEDICINE
Payer: COMMERCIAL

## 2019-10-04 ENCOUNTER — APPOINTMENT (OUTPATIENT)
Dept: CARDIAC REHAB | Facility: HOSPITAL | Age: 64
End: 2019-10-04
Attending: INTERNAL MEDICINE
Payer: COMMERCIAL

## 2019-10-05 ENCOUNTER — HOSPITAL ENCOUNTER (OUTPATIENT)
Dept: CV DIAGNOSTICS | Facility: HOSPITAL | Age: 64
Discharge: HOME | End: 2019-10-05
Attending: INTERNAL MEDICINE
Payer: COMMERCIAL

## 2019-10-05 ENCOUNTER — LAB ENCOUNTER (OUTPATIENT)
Dept: LAB | Facility: HOSPITAL | Age: 64
End: 2019-10-05
Attending: INTERNAL MEDICINE
Payer: COMMERCIAL

## 2019-10-05 DIAGNOSIS — I10 ESSENTIAL HYPERTENSION, BENIGN: ICD-10-CM

## 2019-10-05 DIAGNOSIS — I25.10 CORONARY ARTERY DISEASE: Primary | ICD-10-CM

## 2019-10-05 DIAGNOSIS — I25.10 CORONARY ATHEROSCLEROSIS OF NATIVE CORONARY ARTERY: ICD-10-CM

## 2019-10-05 LAB
CHOLEST SERPL-MCNC: 86 MG/DL
CHOLEST/HDLC SERPL: NORMAL {RATIO}
HDLC SERPL-MCNC: 35 MG/DL
LDLC SERPL CALC-MCNC: 37 MG/DL
LENGTH OF FAST TIME PATIENT: YES H
NONHDLC SERPL-MCNC: 51 MG/DL
TRIGL SERPL-MCNC: 70 MG/DL
VLDLC SERPL CALC-MCNC: 14 MG/DL

## 2019-10-05 PROCEDURE — 93306 TTE W/DOPPLER COMPLETE: CPT | Performed by: INTERNAL MEDICINE

## 2019-10-05 PROCEDURE — 36415 COLL VENOUS BLD VENIPUNCTURE: CPT

## 2019-10-05 PROCEDURE — 80061 LIPID PANEL: CPT

## 2019-10-07 ENCOUNTER — APPOINTMENT (OUTPATIENT)
Dept: CARDIAC REHAB | Facility: HOSPITAL | Age: 64
End: 2019-10-07
Attending: INTERNAL MEDICINE
Payer: COMMERCIAL

## 2019-10-07 ENCOUNTER — TELEPHONE (OUTPATIENT)
Dept: CARDIOLOGY | Age: 64
End: 2019-10-07

## 2019-10-08 ENCOUNTER — TELEPHONE (OUTPATIENT)
Dept: CARDIOLOGY | Age: 64
End: 2019-10-08

## 2019-10-09 ENCOUNTER — APPOINTMENT (OUTPATIENT)
Dept: CARDIAC REHAB | Facility: HOSPITAL | Age: 64
End: 2019-10-09
Attending: INTERNAL MEDICINE
Payer: COMMERCIAL

## 2019-10-09 DIAGNOSIS — I25.10 CORONARY ARTERY DISEASE INVOLVING NATIVE CORONARY ARTERY OF NATIVE HEART WITHOUT ANGINA PECTORIS: ICD-10-CM

## 2019-10-09 DIAGNOSIS — I10 ESSENTIAL HYPERTENSION: ICD-10-CM

## 2019-10-10 ENCOUNTER — TELEPHONE (OUTPATIENT)
Dept: CARDIOLOGY | Age: 64
End: 2019-10-10

## 2019-10-10 ENCOUNTER — CLINICAL ABSTRACT (OUTPATIENT)
Dept: CARDIOLOGY | Age: 64
End: 2019-10-10

## 2019-10-11 ENCOUNTER — APPOINTMENT (OUTPATIENT)
Dept: CARDIAC REHAB | Facility: HOSPITAL | Age: 64
End: 2019-10-11
Attending: INTERNAL MEDICINE
Payer: COMMERCIAL

## 2019-10-11 NOTE — TELEPHONE ENCOUNTER
Patient is due for DM eye exam. Referral generated 2/19/18. Left message to call back [phone].
Spouse returned call, informed that patient is due for eye exam. States he goes to a facility near their home. Informed to please have eye doctor forward a copy of the report to the clinic.
Patient is a 75 y/o F a&ox4 p/w a c/c of one episode of NBNB vomiting on Monday, as well as two loose stools on Tuesday.  Patient presenting today reporting nausea, and lethargy, denies fevers/chills, abd pain, SOB, CP.  On exam abd soft non-tender non-distended, denies bright red blood in stool, dark tarry stools.  Denies cough, runny nose, sore throat, myalgias.  Respirations unlabored, patient in nad.

## 2019-10-14 ENCOUNTER — OFFICE VISIT (OUTPATIENT)
Dept: INTERNAL MEDICINE CLINIC | Facility: CLINIC | Age: 64
End: 2019-10-14

## 2019-10-14 ENCOUNTER — APPOINTMENT (OUTPATIENT)
Dept: CARDIAC REHAB | Facility: HOSPITAL | Age: 64
End: 2019-10-14
Attending: INTERNAL MEDICINE
Payer: COMMERCIAL

## 2019-10-14 ENCOUNTER — APPOINTMENT (OUTPATIENT)
Dept: LAB | Facility: HOSPITAL | Age: 64
End: 2019-10-14
Attending: INTERNAL MEDICINE
Payer: COMMERCIAL

## 2019-10-14 VITALS
DIASTOLIC BLOOD PRESSURE: 72 MMHG | WEIGHT: 210.19 LBS | BODY MASS INDEX: 27.86 KG/M2 | HEART RATE: 60 BPM | SYSTOLIC BLOOD PRESSURE: 134 MMHG | HEIGHT: 73 IN

## 2019-10-14 DIAGNOSIS — R80.9 TYPE 2 DIABETES MELLITUS WITH MICROALBUMINURIA, WITHOUT LONG-TERM CURRENT USE OF INSULIN (HCC): ICD-10-CM

## 2019-10-14 DIAGNOSIS — E11.69 DYSLIPIDEMIA ASSOCIATED WITH TYPE 2 DIABETES MELLITUS (HCC): ICD-10-CM

## 2019-10-14 DIAGNOSIS — E11.29 TYPE 2 DIABETES MELLITUS WITH MICROALBUMINURIA, WITHOUT LONG-TERM CURRENT USE OF INSULIN (HCC): ICD-10-CM

## 2019-10-14 DIAGNOSIS — I10 ESSENTIAL HYPERTENSION: ICD-10-CM

## 2019-10-14 DIAGNOSIS — E78.5 DYSLIPIDEMIA ASSOCIATED WITH TYPE 2 DIABETES MELLITUS (HCC): ICD-10-CM

## 2019-10-14 DIAGNOSIS — I25.10 ASHD (ARTERIOSCLEROTIC HEART DISEASE): Primary | ICD-10-CM

## 2019-10-14 PROCEDURE — 83036 HEMOGLOBIN GLYCOSYLATED A1C: CPT

## 2019-10-14 PROCEDURE — 99213 OFFICE O/P EST LOW 20 MIN: CPT | Performed by: INTERNAL MEDICINE

## 2019-10-14 PROCEDURE — 36415 COLL VENOUS BLD VENIPUNCTURE: CPT

## 2019-10-14 NOTE — PATIENT INSTRUCTIONS
Await results of today's glycohemoglobin. Await upcoming appointment with Cardiology. Please schedule an appointment soon with Ophthalmology for an eye exam.  Continue healthy diet and regular exercise. Continue current medications.   Return visit in 3 m

## 2019-10-14 NOTE — PROGRESS NOTES
Hany Brizuela is a 62year old male. Patient presents with:  Hypertension  Diabetes    HPI:   Mr. Fanta Holland presents this evening for follow-up of ASHD, type 2 diabetes, hypertension and dyslipidemia. \"I feel great. \"  Since his last visit here in July, diet myocardial infarction (NSTEMI) ([facility] 07/2019   • ASHD (arteriosclerotic heart disease)     Cardiac cath 7-19 with posterobasal and diaphragmatic akinesia, EF 40-45%, mild MR, 70-80% distal LAD, 90% proximal LCx, 80-90% distal LCx, occlusion proximal RI, 99 Future    3. Essential hypertension  Well-controlled    4. Dyslipidemia associated with type 2 diabetes mellitus ([address]  Well-controlled on higher dose of atorvastatin      The patient indicates understanding of these issues and agrees to the plan.   The pat

## 2019-10-15 ENCOUNTER — TELEPHONE (OUTPATIENT)
Dept: INTERNAL MEDICINE CLINIC | Facility: CLINIC | Age: 64
End: 2019-10-15

## 2019-10-16 ENCOUNTER — APPOINTMENT (OUTPATIENT)
Dept: CARDIAC REHAB | Facility: HOSPITAL | Age: 64
End: 2019-10-16
Attending: INTERNAL MEDICINE
Payer: COMMERCIAL

## 2019-10-16 NOTE — TELEPHONE ENCOUNTER
Patient's wife Jaxon Rahman (on VICKIE) contacted (Name and  of pt verified). All results and recommendations reviewed. Wife verbalizes understanding, denies further questions and agrees with plan of care.     Written by Bria Mcdermott MD on 10/15/2019  7:59 A

## 2019-10-18 ENCOUNTER — APPOINTMENT (OUTPATIENT)
Dept: CARDIAC REHAB | Facility: HOSPITAL | Age: 64
End: 2019-10-18
Attending: INTERNAL MEDICINE
Payer: COMMERCIAL

## 2019-10-21 ENCOUNTER — APPOINTMENT (OUTPATIENT)
Dept: CARDIAC REHAB | Facility: HOSPITAL | Age: 64
End: 2019-10-21
Attending: INTERNAL MEDICINE
Payer: COMMERCIAL

## 2019-10-23 ENCOUNTER — APPOINTMENT (OUTPATIENT)
Dept: CARDIAC REHAB | Facility: HOSPITAL | Age: 64
End: 2019-10-23
Attending: INTERNAL MEDICINE
Payer: COMMERCIAL

## 2019-10-23 NOTE — TELEPHONE ENCOUNTER
Pt's spouse, Donna Park (VICKIE in place) states she received a message to call the office for test results, states pt has already received results as noted below. Spouse had no further questions at this time.

## 2019-10-25 ENCOUNTER — APPOINTMENT (OUTPATIENT)
Dept: CARDIAC REHAB | Facility: HOSPITAL | Age: 64
End: 2019-10-25
Attending: INTERNAL MEDICINE
Payer: COMMERCIAL

## 2019-10-28 ENCOUNTER — TELEPHONE (OUTPATIENT)
Dept: CASE MANAGEMENT | Age: 64
End: 2019-10-28

## 2019-10-28 ENCOUNTER — APPOINTMENT (OUTPATIENT)
Dept: CARDIAC REHAB | Facility: HOSPITAL | Age: 64
End: 2019-10-28
Attending: INTERNAL MEDICINE
Payer: COMMERCIAL

## 2019-10-28 RX ORDER — BLOOD SUGAR DIAGNOSTIC
STRIP MISCELLANEOUS
Refills: 11 | COMMUNITY
Start: 2019-09-13

## 2019-10-30 ENCOUNTER — APPOINTMENT (OUTPATIENT)
Dept: CARDIAC REHAB | Facility: HOSPITAL | Age: 64
End: 2019-10-30
Attending: INTERNAL MEDICINE
Payer: COMMERCIAL

## 2019-10-30 ENCOUNTER — OFFICE VISIT (OUTPATIENT)
Dept: CARDIOLOGY | Age: 64
End: 2019-10-30

## 2019-10-30 VITALS
SYSTOLIC BLOOD PRESSURE: 112 MMHG | OXYGEN SATURATION: 98 % | HEIGHT: 73 IN | BODY MASS INDEX: 27.43 KG/M2 | WEIGHT: 207 LBS | DIASTOLIC BLOOD PRESSURE: 70 MMHG | HEART RATE: 70 BPM

## 2019-10-30 DIAGNOSIS — E78.2 HYPERLIPIDEMIA, MIXED: ICD-10-CM

## 2019-10-30 DIAGNOSIS — I10 ESSENTIAL HYPERTENSION: ICD-10-CM

## 2019-10-30 DIAGNOSIS — I25.10 CORONARY ARTERY DISEASE INVOLVING NATIVE CORONARY ARTERY OF NATIVE HEART WITHOUT ANGINA PECTORIS: Primary | ICD-10-CM

## 2019-10-30 PROCEDURE — 3078F DIAST BP <80 MM HG: CPT | Performed by: INTERNAL MEDICINE

## 2019-10-30 PROCEDURE — 99214 OFFICE O/P EST MOD 30 MIN: CPT | Performed by: INTERNAL MEDICINE

## 2019-10-30 PROCEDURE — 3074F SYST BP LT 130 MM HG: CPT | Performed by: INTERNAL MEDICINE

## 2019-10-30 SDOH — HEALTH STABILITY: MENTAL HEALTH: HOW OFTEN DO YOU HAVE A DRINK CONTAINING ALCOHOL?: NEVER

## 2019-10-30 ASSESSMENT — PATIENT HEALTH QUESTIONNAIRE - PHQ9
2. FEELING DOWN, DEPRESSED OR HOPELESS: NOT AT ALL
1. LITTLE INTEREST OR PLEASURE IN DOING THINGS: NOT AT ALL
SUM OF ALL RESPONSES TO PHQ9 QUESTIONS 1 AND 2: 0
SUM OF ALL RESPONSES TO PHQ9 QUESTIONS 1 AND 2: 0

## 2019-10-30 NOTE — H&P
Charlene Layne is a 65year old male who presents for a complete physical exam.   HPI:   Previous PCP was Dr. Susan Gould who has left the clinic. He feels well today. No specific issues for discussion.   Previous history of type 2 diabetes diagnosed in 2016 samantha daily,E11.9 Disp: 200 each Rfl: 1     No Known Allergies   Past Medical History:   Diagnosis Date   • Dyslipidemia associated with type 2 diabetes mellitus ([facility]    • Essential hypertension    • Type 2 [facility] 2016      Past Surgical Histor Carotid upstrokes 2+ without carotid bruits, distal pulses 2+ bilateral dorsalis pedis and posterior tibial  NEURO: Sensory testing with the 10 g monofilament diabetic sensory exam instrument is normal in both feet  GENITAL: Testes normal without mass and good balance

## 2019-11-01 ENCOUNTER — APPOINTMENT (OUTPATIENT)
Dept: CARDIAC REHAB | Facility: HOSPITAL | Age: 64
End: 2019-11-01
Attending: INTERNAL MEDICINE
Payer: COMMERCIAL

## 2019-11-04 ENCOUNTER — APPOINTMENT (OUTPATIENT)
Dept: CARDIAC REHAB | Facility: HOSPITAL | Age: 64
End: 2019-11-04
Attending: INTERNAL MEDICINE
Payer: COMMERCIAL

## 2019-11-04 ENCOUNTER — TELEPHONE (OUTPATIENT)
Dept: CARDIOLOGY | Age: 64
End: 2019-11-04

## 2019-11-06 ENCOUNTER — APPOINTMENT (OUTPATIENT)
Dept: CARDIAC REHAB | Facility: HOSPITAL | Age: 64
End: 2019-11-06
Attending: INTERNAL MEDICINE
Payer: COMMERCIAL

## 2019-11-08 ENCOUNTER — APPOINTMENT (OUTPATIENT)
Dept: CARDIAC REHAB | Facility: HOSPITAL | Age: 64
End: 2019-11-08
Attending: INTERNAL MEDICINE
Payer: COMMERCIAL

## 2019-11-18 ENCOUNTER — E-ADVICE (OUTPATIENT)
Dept: CARDIOLOGY | Age: 64
End: 2019-11-18

## 2019-11-18 ENCOUNTER — TELEPHONE (OUTPATIENT)
Dept: CARDIOLOGY | Age: 64
End: 2019-11-18

## 2019-12-07 RX ORDER — METOPROLOL TARTRATE 50 MG/1
TABLET, FILM COATED ORAL
Qty: 180 TABLET | Refills: 1 | Status: SHIPPED | OUTPATIENT
Start: 2019-12-07 | End: 2020-02-10

## 2019-12-07 NOTE — TELEPHONE ENCOUNTER
Refill passed per [facility], [facility] protocol.   Hypertensive Medications  Protocol Criteria:  · Appointment scheduled in the past 6 months or in the next 3 months  · BMP or CMP in the past 12 months  · Creatinine result < 2  Recent Outpatient Visits Cardiopulmonary Rehab    Nurse Only    2 months ago     [facility]    Nurse Only    2 months ago     [facility]    Nurse Only        Future Appointments       Provider Department A

## 2020-01-08 ENCOUNTER — TELEPHONE (OUTPATIENT)
Dept: CARDIOLOGY | Age: 65
End: 2020-01-08

## 2020-01-18 ENCOUNTER — APPOINTMENT (OUTPATIENT)
Dept: LAB | Facility: HOSPITAL | Age: 65
End: 2020-01-18
Attending: INTERNAL MEDICINE
Payer: COMMERCIAL

## 2020-01-18 ENCOUNTER — OFFICE VISIT (OUTPATIENT)
Dept: INTERNAL MEDICINE CLINIC | Facility: CLINIC | Age: 65
End: 2020-01-18

## 2020-01-18 VITALS
RESPIRATION RATE: 18 BRPM | HEIGHT: 73 IN | BODY MASS INDEX: 26.51 KG/M2 | HEART RATE: 54 BPM | DIASTOLIC BLOOD PRESSURE: 66 MMHG | SYSTOLIC BLOOD PRESSURE: 124 MMHG | WEIGHT: 200 LBS

## 2020-01-18 DIAGNOSIS — E11.29 TYPE 2 DIABETES MELLITUS WITH MICROALBUMINURIA, WITHOUT LONG-TERM CURRENT USE OF INSULIN (HCC): Primary | ICD-10-CM

## 2020-01-18 DIAGNOSIS — I10 ESSENTIAL HYPERTENSION: ICD-10-CM

## 2020-01-18 DIAGNOSIS — E11.29 TYPE 2 DIABETES MELLITUS WITH MICROALBUMINURIA, WITHOUT LONG-TERM CURRENT USE OF INSULIN (HCC): ICD-10-CM

## 2020-01-18 DIAGNOSIS — R80.9 TYPE 2 DIABETES MELLITUS WITH MICROALBUMINURIA, WITHOUT LONG-TERM CURRENT USE OF INSULIN (HCC): Primary | ICD-10-CM

## 2020-01-18 DIAGNOSIS — R80.9 TYPE 2 DIABETES MELLITUS WITH MICROALBUMINURIA, WITHOUT LONG-TERM CURRENT USE OF INSULIN (HCC): ICD-10-CM

## 2020-01-18 DIAGNOSIS — I25.10 ASHD (ARTERIOSCLEROTIC HEART DISEASE): ICD-10-CM

## 2020-01-18 LAB
EST. AVERAGE GLUCOSE BLD GHB EST-MCNC: 134 MG/DL (ref 68–126)
HBA1C MFR BLD HPLC: 6.3 % (ref ?–5.7)

## 2020-01-18 PROCEDURE — 36415 COLL VENOUS BLD VENIPUNCTURE: CPT

## 2020-01-18 PROCEDURE — 83036 HEMOGLOBIN GLYCOSYLATED A1C: CPT

## 2020-01-18 PROCEDURE — 99213 OFFICE O/P EST LOW 20 MIN: CPT | Performed by: INTERNAL MEDICINE

## 2020-01-18 NOTE — PATIENT INSTRUCTIONS
Await results of today's glycohemoglobin level. Continue current medication. Continue healthy diet and regular physical activity. Please schedule a physical in 3 months.

## 2020-01-18 NOTE — PROGRESS NOTES
Darius Draper is a 62year old male. Patient presents with:  Diabetes  Hyperlipidemia    HPI:   Mr. Layla Abrams presents this morning for follow-up of ASHD, type 2 diabetes and hypertension.     Since his last visit with me in October, he has lost 10 pounds, and he 70-80% distal LAD, 90% proximal LCx, 80-90% distal LCx, occlusion proximal RI, 99% distal RCA, s/p KATIE RCA LCx and OM   • Diabetic nephropathy (HCC)     Microalbuminuria   • Dyslipidemia associated with type 2 diabetes mellitus ([address]    • Essential hyperte

## 2020-01-21 ENCOUNTER — TELEPHONE (OUTPATIENT)
Dept: INTERNAL MEDICINE CLINIC | Facility: CLINIC | Age: 65
End: 2020-01-21

## 2020-01-21 NOTE — TELEPHONE ENCOUNTER
Call Details: Patient's wife is calling to her  lab results. Call transferred to RN Triage ([id number]).

## 2020-02-10 RX ORDER — TICAGRELOR 90 MG/1
TABLET ORAL
Qty: 180 TABLET | Refills: 1 | Status: SHIPPED | OUTPATIENT
Start: 2020-02-10 | End: 2020-07-11

## 2020-02-10 RX ORDER — LOSARTAN POTASSIUM 50 MG/1
TABLET ORAL
Qty: 90 TABLET | Refills: 1 | Status: SHIPPED | OUTPATIENT
Start: 2020-02-10 | End: 2020-07-11

## 2020-02-10 RX ORDER — METOPROLOL TARTRATE 50 MG/1
TABLET, FILM COATED ORAL
Qty: 180 TABLET | Refills: 1 | Status: SHIPPED | OUTPATIENT
Start: 2020-02-10 | End: 2020-07-11

## 2020-02-10 NOTE — TELEPHONE ENCOUNTER
Review pended refill request as it does not fall under a protocol. Last Rx: 01/12/20  LOV: 01/18/20  Refill passed per [facility], [facility] protocol.   Hypertensive Medications  Protocol Criteria:  · Appointment scheduled in the past 6 months or in the next 3

## 2020-02-10 NOTE — TELEPHONE ENCOUNTER
Refill passed per [facility], [facility] protocol.     Hypertensive Medications  Protocol Criteria:  · Appointment scheduled in the past 6 months or in the next 3 months  · BMP or CMP in the past 12 months  · Creatinine result < 2  Recent Outpatient Visits

## 2020-02-14 RX ORDER — ATORVASTATIN CALCIUM 80 MG/1
TABLET, FILM COATED ORAL
Qty: 90 TABLET | Refills: 1 | Status: SHIPPED | OUTPATIENT
Start: 2020-02-14 | End: 2020-07-11

## 2020-02-14 NOTE — TELEPHONE ENCOUNTER
Cholesterol Medications  Protocol Criteria:  · Appointment scheduled in the past 12 months or in the next 3 months  · ALT & LDL on file in the past 12 months  · ALT result < 80  · LDL result <130   Recent Outpatient Visits            3 weeks ago Type 2 josue

## 2020-05-04 ENCOUNTER — APPOINTMENT (OUTPATIENT)
Dept: CARDIOLOGY | Age: 65
End: 2020-05-04

## 2020-05-14 ENCOUNTER — OFFICE VISIT (OUTPATIENT)
Dept: CARDIOLOGY | Age: 65
End: 2020-05-14

## 2020-05-14 ENCOUNTER — TELEPHONE (OUTPATIENT)
Dept: CARDIOLOGY | Age: 65
End: 2020-05-14

## 2020-05-14 VITALS
DIASTOLIC BLOOD PRESSURE: 76 MMHG | BODY MASS INDEX: 26.18 KG/M2 | HEIGHT: 74 IN | SYSTOLIC BLOOD PRESSURE: 132 MMHG | WEIGHT: 204 LBS

## 2020-05-14 DIAGNOSIS — E78.2 HYPERLIPIDEMIA, MIXED: ICD-10-CM

## 2020-05-14 DIAGNOSIS — I10 ESSENTIAL HYPERTENSION: ICD-10-CM

## 2020-05-14 DIAGNOSIS — Z86.79 HISTORY OF AORTIC REGURGITATION: ICD-10-CM

## 2020-05-14 DIAGNOSIS — I25.10 CORONARY ARTERY DISEASE INVOLVING NATIVE CORONARY ARTERY OF NATIVE HEART WITHOUT ANGINA PECTORIS: Primary | ICD-10-CM

## 2020-05-14 PROCEDURE — 99214 OFFICE O/P EST MOD 30 MIN: CPT | Performed by: INTERNAL MEDICINE

## 2020-05-14 SDOH — HEALTH STABILITY: MENTAL HEALTH: HOW OFTEN DO YOU HAVE A DRINK CONTAINING ALCOHOL?: NEVER

## 2020-05-15 ENCOUNTER — MED REC SCAN ONLY (OUTPATIENT)
Dept: INTERNAL MEDICINE CLINIC | Facility: CLINIC | Age: 65
End: 2020-05-15

## 2020-07-11 ENCOUNTER — VIRTUAL PHONE E/M (OUTPATIENT)
Dept: INTERNAL MEDICINE CLINIC | Facility: CLINIC | Age: 65
End: 2020-07-11

## 2020-07-11 DIAGNOSIS — E78.5 DYSLIPIDEMIA ASSOCIATED WITH TYPE 2 DIABETES MELLITUS (HCC): ICD-10-CM

## 2020-07-11 DIAGNOSIS — E11.69 DYSLIPIDEMIA ASSOCIATED WITH TYPE 2 DIABETES MELLITUS (HCC): ICD-10-CM

## 2020-07-11 DIAGNOSIS — R80.9 TYPE 2 DIABETES MELLITUS WITH MICROALBUMINURIA, WITHOUT LONG-TERM CURRENT USE OF INSULIN (HCC): ICD-10-CM

## 2020-07-11 DIAGNOSIS — I10 ESSENTIAL HYPERTENSION: ICD-10-CM

## 2020-07-11 DIAGNOSIS — I25.10 ASHD (ARTERIOSCLEROTIC HEART DISEASE): Primary | ICD-10-CM

## 2020-07-11 DIAGNOSIS — E11.29 TYPE 2 DIABETES MELLITUS WITH MICROALBUMINURIA, WITHOUT LONG-TERM CURRENT USE OF INSULIN (HCC): ICD-10-CM

## 2020-07-11 PROCEDURE — 99213 OFFICE O/P EST LOW 20 MIN: CPT | Performed by: INTERNAL MEDICINE

## 2020-07-11 RX ORDER — METOPROLOL TARTRATE 50 MG/1
50 TABLET, FILM COATED ORAL 2 TIMES DAILY
Qty: 180 TABLET | Refills: 1 | Status: SHIPPED | OUTPATIENT
Start: 2020-07-11 | End: 2020-08-21

## 2020-07-11 RX ORDER — ATORVASTATIN CALCIUM 80 MG/1
TABLET, FILM COATED ORAL
Qty: 90 TABLET | Refills: 1 | Status: SHIPPED | OUTPATIENT
Start: 2020-07-11 | End: 2020-08-21

## 2020-07-11 RX ORDER — LOSARTAN POTASSIUM 50 MG/1
50 TABLET ORAL DAILY
Qty: 90 TABLET | Refills: 1 | Status: SHIPPED | OUTPATIENT
Start: 2020-07-11 | End: 2020-08-06

## 2020-07-11 NOTE — PATIENT INSTRUCTIONS
Please continue your current medications. Continue healthy diet and regular exercise.   Please schedule a physical in August.

## 2020-07-11 NOTE — PROGRESS NOTES
Virtual Telephone Check-In    Juan Ricci verbally consents to a Virtual/Telephone Check-In visit on 07/11/20. Patient has been referred to the [facility] website at [email or web address] to review the yearly Consent to Treat document.     Patient understands strip 11   • aspirin 81 MG Oral Tab EC Take 1 tablet (81 mg total) by mouth daily.  With food 30 tablet 5   • Blood Glucose Monitoring Suppl (ACCU-CHEK COMPACT PLUS CARE) Does not apply Kit Check blood glucose daily,E11.9 1 kit 0   • Lancets Does not apply associated with type 2 diabetes mellitus ([facility]  Continue statin therapy      The patient indicates understanding of these issues and agrees to the plan. The patient is asked to return in 1 month.     Javad Guthrie MD  7/11/2020  9:18 AM        [facility]ust[facility] Gooden

## 2020-08-06 RX ORDER — LOSARTAN POTASSIUM 50 MG/1
TABLET ORAL
Qty: 90 TABLET | Refills: 0 | Status: SHIPPED | OUTPATIENT
Start: 2020-08-06 | End: 2020-11-07

## 2020-08-21 RX ORDER — ATORVASTATIN CALCIUM 80 MG/1
TABLET, FILM COATED ORAL
Qty: 90 TABLET | Refills: 0 | Status: SHIPPED | OUTPATIENT
Start: 2020-08-21 | End: 2021-02-20

## 2020-08-21 RX ORDER — TICAGRELOR 90 MG/1
TABLET ORAL
Qty: 180 TABLET | Refills: 0 | Status: SHIPPED | OUTPATIENT
Start: 2020-08-21 | End: 2021-02-20

## 2020-08-21 RX ORDER — METOPROLOL TARTRATE 50 MG/1
TABLET, FILM COATED ORAL
Qty: 180 TABLET | Refills: 0 | Status: SHIPPED | OUTPATIENT
Start: 2020-08-21 | End: 2021-02-20

## 2020-08-29 ENCOUNTER — OFFICE VISIT (OUTPATIENT)
Dept: INTERNAL MEDICINE CLINIC | Facility: CLINIC | Age: 65
End: 2020-08-29

## 2020-08-29 ENCOUNTER — LAB ENCOUNTER (OUTPATIENT)
Dept: LAB | Facility: HOSPITAL | Age: 65
End: 2020-08-29
Attending: INTERNAL MEDICINE
Payer: COMMERCIAL

## 2020-08-29 VITALS
SYSTOLIC BLOOD PRESSURE: 118 MMHG | BODY MASS INDEX: 26.95 KG/M2 | HEIGHT: 73 IN | DIASTOLIC BLOOD PRESSURE: 66 MMHG | WEIGHT: 203.38 LBS | HEART RATE: 56 BPM

## 2020-08-29 DIAGNOSIS — I10 ESSENTIAL HYPERTENSION: ICD-10-CM

## 2020-08-29 DIAGNOSIS — E11.69 DYSLIPIDEMIA ASSOCIATED WITH TYPE 2 DIABETES MELLITUS (HCC): ICD-10-CM

## 2020-08-29 DIAGNOSIS — E11.29 TYPE 2 DIABETES MELLITUS WITH MICROALBUMINURIA, WITHOUT LONG-TERM CURRENT USE OF INSULIN (HCC): ICD-10-CM

## 2020-08-29 DIAGNOSIS — E78.5 DYSLIPIDEMIA ASSOCIATED WITH TYPE 2 DIABETES MELLITUS (HCC): ICD-10-CM

## 2020-08-29 DIAGNOSIS — Z00.00 ANNUAL PHYSICAL EXAM: Primary | ICD-10-CM

## 2020-08-29 DIAGNOSIS — E11.21 DIABETIC NEPHROPATHY ASSOCIATED WITH TYPE 2 DIABETES MELLITUS (HCC): ICD-10-CM

## 2020-08-29 DIAGNOSIS — R80.9 TYPE 2 DIABETES MELLITUS WITH MICROALBUMINURIA, WITHOUT LONG-TERM CURRENT USE OF INSULIN (HCC): ICD-10-CM

## 2020-08-29 DIAGNOSIS — Z00.00 ANNUAL PHYSICAL EXAM: ICD-10-CM

## 2020-08-29 DIAGNOSIS — I25.10 ASHD (ARTERIOSCLEROTIC HEART DISEASE): ICD-10-CM

## 2020-08-29 LAB
ALBUMIN SERPL-MCNC: 3.8 G/DL
ALBUMIN SERPL-MCNC: 3.8 G/DL (ref 3.4–5)
ALBUMIN/GLOB SERPL: 1.4 {RATIO}
ALBUMIN/GLOB SERPL: 1.4 {RATIO} (ref 1–2)
ALP LIVER SERPL-CCNC: 53 U/L (ref 45–117)
ALP SERPL-CCNC: 53 U/L
ALT SERPL-CCNC: 32 U/L (ref 16–61)
ALT SERPL-CCNC: 32 UNITS/L
ANION GAP SERPL CALC-SCNC: 6 MMOL/L
ANION GAP SERPL CALC-SCNC: 6 MMOL/L (ref 0–18)
AST SERPL-CCNC: 10 U/L (ref 15–37)
AST SERPL-CCNC: 10 UNITS/L
BILIRUB SERPL-MCNC: 0.7 MG/DL
BILIRUB SERPL-MCNC: 0.7 MG/DL (ref 0.1–2)
BUN BLD-MCNC: 17 MG/DL (ref 7–18)
BUN SERPL-MCNC: 17 MG/DL
BUN/CREAT SERPL: 14.5
BUN/CREAT SERPL: 14.5 (ref 10–20)
CALCIUM BLD-MCNC: 9 MG/DL (ref 8.5–10.1)
CALCIUM SERPL-MCNC: 9 MG/DL
CHLORIDE SERPL-SCNC: 108 MMOL/L
CHLORIDE SERPL-SCNC: 108 MMOL/L (ref 98–112)
CHOLEST SERPL-MCNC: 86 MG/DL
CHOLEST SMN-MCNC: 86 MG/DL (ref ?–200)
CO2 SERPL-SCNC: 27 MMOL/L
CO2 SERPL-SCNC: 27 MMOL/L (ref 21–32)
COMPLEXED PSA SERPL-MCNC: 1.21 NG/ML (ref ?–4)
CREAT BLD-MCNC: 1.17 MG/DL (ref 0.7–1.3)
CREAT SERPL-MCNC: 1.17 MG/DL
CREAT UR-SCNC: 214 MG/DL
DEPRECATED RDW RBC AUTO: 44.6 FL (ref 35.1–46.3)
ERYTHROCYTE [DISTWIDTH] IN BLOOD BY AUTOMATED COUNT: 13.5 % (ref 11–15)
ERYTHROCYTE [DISTWIDTH] IN BLOOD: 13.5 %
EST. AVERAGE GLUCOSE BLD GHB EST-MCNC: 131 MG/DL (ref 68–126)
ESTIMATED AVERAGE GLUCOSE: 131
GLOBULIN PLAS-MCNC: 2.7 G/DL (ref 2.8–4.4)
GLOBULIN SER-MCNC: 2.7 G/DL
GLUCOSE BLD-MCNC: 105 MG/DL (ref 70–99)
GLUCOSE SERPL-MCNC: 105 MG/DL
HBA1C MFR BLD HPLC: 6.2 % (ref ?–5.7)
HBA1C MFR BLD: 6.2 %
HCT VFR BLD AUTO: 43.2 % (ref 39–53)
HCT VFR BLD CALC: 43.2 %
HDLC SERPL-MCNC: 37 MG/DL
HDLC SERPL-MCNC: 37 MG/DL (ref 40–59)
HGB BLD-MCNC: 14.1 G/DL
HGB BLD-MCNC: 14.1 G/DL (ref 13–17.5)
LDLC SERPL CALC-MCNC: 39 MG/DL
LDLC SERPL CALC-MCNC: 39 MG/DL (ref ?–100)
LENGTH OF FAST TIME PATIENT: YES H
LENGTH OF FAST TIME PATIENT: YES H
M PROTEIN MFR SERPL ELPH: 6.5 G/DL (ref 6.4–8.2)
MCH RBC QN AUTO: 29.4 PG
MCH RBC QN AUTO: 29.4 PG (ref 26–34)
MCHC RBC AUTO-ENTMCNC: 32.6 G/DL
MCHC RBC AUTO-ENTMCNC: 32.6 G/DL (ref 31–37)
MCV RBC AUTO: 90.2 FL
MCV RBC AUTO: 90.2 FL (ref 80–100)
MICROALBUMIN UR-MCNC: 1.85 MG/DL
MICROALBUMIN/CREAT 24H UR-RTO: 8.6 UG/MG (ref ?–30)
NONHDLC SERPL-MCNC: 49 MG/DL
NONHDLC SERPL-MCNC: 49 MG/DL (ref ?–130)
OSMOLALITY SERPL CALC.SUM OF ELEC: 294 MOSM/KG (ref 275–295)
PATIENT FASTING Y/N/NP: YES
PATIENT FASTING Y/N/NP: YES
PLATELET # BLD AUTO: 161 10(3)UL (ref 150–450)
PLATELET # BLD: 161 K/MCL
POTASSIUM SERPL-SCNC: 4.2 MMOL/L
POTASSIUM SERPL-SCNC: 4.2 MMOL/L (ref 3.5–5.1)
PROT SERPL-MCNC: 6.5 G/DL
RBC # BLD AUTO: 4.79 X10(6)UL (ref 4.3–5.7)
RBC # BLD: 4.79 10*6/UL
SODIUM SERPL-SCNC: 141 MMOL/L
SODIUM SERPL-SCNC: 141 MMOL/L (ref 136–145)
TRIGL SERPL-MCNC: 51 MG/DL
TRIGL SERPL-MCNC: 51 MG/DL (ref 30–149)
VLDLC SERPL CALC-MCNC: 10 MG/DL
VLDLC SERPL CALC-MCNC: 10 MG/DL (ref 0–30)
WBC # BLD AUTO: 6 X10(3) UL (ref 4–11)
WBC # BLD: 6 K/MCL

## 2020-08-29 PROCEDURE — 85027 COMPLETE CBC AUTOMATED: CPT

## 2020-08-29 PROCEDURE — 99396 PREV VISIT EST AGE 40-64: CPT | Performed by: INTERNAL MEDICINE

## 2020-08-29 PROCEDURE — 82043 UR ALBUMIN QUANTITATIVE: CPT

## 2020-08-29 PROCEDURE — 3074F SYST BP LT 130 MM HG: CPT | Performed by: INTERNAL MEDICINE

## 2020-08-29 PROCEDURE — 82570 ASSAY OF URINE CREATININE: CPT

## 2020-08-29 PROCEDURE — 36415 COLL VENOUS BLD VENIPUNCTURE: CPT

## 2020-08-29 PROCEDURE — 80061 LIPID PANEL: CPT

## 2020-08-29 PROCEDURE — 83036 HEMOGLOBIN GLYCOSYLATED A1C: CPT

## 2020-08-29 PROCEDURE — 3078F DIAST BP <80 MM HG: CPT | Performed by: INTERNAL MEDICINE

## 2020-08-29 PROCEDURE — 80053 COMPREHEN METABOLIC PANEL: CPT

## 2020-08-29 PROCEDURE — 3008F BODY MASS INDEX DOCD: CPT | Performed by: INTERNAL MEDICINE

## 2020-08-29 NOTE — H&P
Sil Cordova is a 60year old male who presents for a complete physical exam.   HPI:   His last physical was March 2019. He feels well. No specific issues for discussion. He works in [employer] service.  to his wife.   Upcoming appointment with Lashawn Does not apply Kit Check blood glucose daily,E11.9 1 kit 0   • Lancets Does not apply Misc Check blood glucose daily,E11.9 200 each 1     No Known Allergies   Past Medical History:   Diagnosis Date   • Acute non-ST elevation myocardial infarction (NSTEMI) Anicteric, conjunctiva pink  NECK: Supple without mass or thyromegaly  NODES: No peripheral adenopathy  LUNGS: Resonant to percussion and clear to auscultation without crackles or wheezes  CARDIAC: Rhythm regular S1 S2 normal without murmur, with trace pit nephropathy associated with type 2 diabetes mellitus ([facility]  Await labs. Continue ARB. 4. Essential hypertension  Well-controlled    5. Dyslipidemia associated with type 2 diabetes mellitus ([facility]  Controlled on statin therapy    6.  ASHD (arterioscleroti

## 2020-08-29 NOTE — PATIENT INSTRUCTIONS
Await results of today's blood and urine testing. Continue current medications. Continue healthy diet and regular physical activity. Please schedule an appointment with Ophthalmology. Please get a flu shot this fall. Return visit in 6 months.

## 2020-09-02 ENCOUNTER — TELEPHONE (OUTPATIENT)
Dept: INTERNAL MEDICINE CLINIC | Facility: CLINIC | Age: 65
End: 2020-09-02

## 2020-09-02 NOTE — TELEPHONE ENCOUNTER
Advised spouse June(on hipaa) of Dr. Kait Tracy note. Spouse verbalized understanding.     Written by Ita Barnett MD on 8/29/2020 12:40 PM   Your chemistry profile (glucose, electrolytes, kidney and liver function) is normal except for minimally border

## 2020-10-05 ENCOUNTER — TELEPHONE (OUTPATIENT)
Dept: INTERNAL MEDICINE CLINIC | Facility: CLINIC | Age: 65
End: 2020-10-05

## 2020-10-05 DIAGNOSIS — I25.10 ASHD (ARTERIOSCLEROTIC HEART DISEASE): Primary | ICD-10-CM

## 2020-10-05 NOTE — TELEPHONE ENCOUNTER
Wife called stating   needs referral for echo cardiogram ordered by dr [place] Moreno. Informed wife if dr Ragini Edmond is ordering than he would start hmo referral request but hmo referral would be needed.     Please sign off referral- sent to both providers to advise    Please advise cpt code for echo so can process referral request      Thanks    [facility]

## 2020-10-17 ENCOUNTER — HOSPITAL ENCOUNTER (OUTPATIENT)
Dept: CV DIAGNOSTICS | Facility: HOSPITAL | Age: 65
Discharge: HOME | End: 2020-10-17
Attending: INTERNAL MEDICINE
Payer: COMMERCIAL

## 2020-10-17 DIAGNOSIS — Z86.79 HISTORY OF AORTIC REGURGITATION: ICD-10-CM

## 2020-10-17 DIAGNOSIS — I25.10 CAD (CORONARY ARTERY DISEASE), NATIVE CORONARY ARTERY: ICD-10-CM

## 2020-10-17 PROCEDURE — 93306 TTE W/DOPPLER COMPLETE: CPT | Performed by: INTERNAL MEDICINE

## 2020-10-21 ENCOUNTER — TELEPHONE (OUTPATIENT)
Dept: CARDIOLOGY | Age: 65
End: 2020-10-21

## 2020-11-07 RX ORDER — LOSARTAN POTASSIUM 50 MG/1
50 TABLET ORAL DAILY
Qty: 90 TABLET | Refills: 1 | Status: SHIPPED | OUTPATIENT
Start: 2020-11-07 | End: 2021-05-09

## 2020-11-13 ENCOUNTER — TELEPHONE (OUTPATIENT)
Dept: CASE MANAGEMENT | Age: 65
End: 2020-11-13

## 2020-11-18 ENCOUNTER — APPOINTMENT (OUTPATIENT)
Dept: CARDIOLOGY | Age: 65
End: 2020-11-18

## 2020-11-18 ENCOUNTER — OFFICE VISIT (OUTPATIENT)
Dept: CARDIOLOGY | Age: 65
End: 2020-11-18

## 2020-11-18 VITALS
HEART RATE: 68 BPM | HEIGHT: 74 IN | DIASTOLIC BLOOD PRESSURE: 70 MMHG | WEIGHT: 202 LBS | BODY MASS INDEX: 25.93 KG/M2 | OXYGEN SATURATION: 99 % | SYSTOLIC BLOOD PRESSURE: 114 MMHG

## 2020-11-18 DIAGNOSIS — E78.2 HYPERLIPIDEMIA, MIXED: ICD-10-CM

## 2020-11-18 DIAGNOSIS — I25.10 CORONARY ARTERY DISEASE INVOLVING NATIVE CORONARY ARTERY OF NATIVE HEART WITHOUT ANGINA PECTORIS: ICD-10-CM

## 2020-11-18 DIAGNOSIS — I10 ESSENTIAL HYPERTENSION: Primary | ICD-10-CM

## 2020-11-18 DIAGNOSIS — Z86.79 HISTORY OF AORTIC REGURGITATION: ICD-10-CM

## 2020-11-18 PROCEDURE — 3074F SYST BP LT 130 MM HG: CPT | Performed by: INTERNAL MEDICINE

## 2020-11-18 PROCEDURE — 99214 OFFICE O/P EST MOD 30 MIN: CPT | Performed by: INTERNAL MEDICINE

## 2020-11-18 PROCEDURE — 3078F DIAST BP <80 MM HG: CPT | Performed by: INTERNAL MEDICINE

## 2020-11-18 SDOH — HEALTH STABILITY: MENTAL HEALTH: HOW OFTEN DO YOU HAVE A DRINK CONTAINING ALCOHOL?: NEVER

## 2020-11-18 ASSESSMENT — PATIENT HEALTH QUESTIONNAIRE - PHQ9
SUM OF ALL RESPONSES TO PHQ9 QUESTIONS 1 AND 2: 0
CLINICAL INTERPRETATION OF PHQ2 SCORE: NO FURTHER SCREENING NEEDED
SUM OF ALL RESPONSES TO PHQ9 QUESTIONS 1 AND 2: 0
2. FEELING DOWN, DEPRESSED OR HOPELESS: NOT AT ALL
1. LITTLE INTEREST OR PLEASURE IN DOING THINGS: NOT AT ALL
CLINICAL INTERPRETATION OF PHQ9 SCORE: NO FURTHER SCREENING NEEDED

## 2020-11-20 ENCOUNTER — MED REC SCAN ONLY (OUTPATIENT)
Dept: INTERNAL MEDICINE CLINIC | Facility: CLINIC | Age: 65
End: 2020-11-20

## 2020-11-25 ENCOUNTER — APPOINTMENT (OUTPATIENT)
Dept: CARDIOLOGY | Age: 65
End: 2020-11-25

## 2021-02-20 RX ORDER — ATORVASTATIN CALCIUM 80 MG/1
80 TABLET, FILM COATED ORAL NIGHTLY
Qty: 90 TABLET | Refills: 0 | Status: SHIPPED | OUTPATIENT
Start: 2021-02-20 | End: 2021-06-13

## 2021-02-20 RX ORDER — METOPROLOL TARTRATE 50 MG/1
50 TABLET, FILM COATED ORAL 2 TIMES DAILY
Qty: 180 TABLET | Refills: 0 | Status: SHIPPED | OUTPATIENT
Start: 2021-02-20 | End: 2021-08-24

## 2021-03-20 DIAGNOSIS — Z23 NEED FOR VACCINATION: ICD-10-CM

## 2021-04-11 ENCOUNTER — IMMUNIZATION (OUTPATIENT)
Dept: LAB | Age: 66
End: 2021-04-11
Attending: HOSPITALIST
Payer: COMMERCIAL

## 2021-04-11 DIAGNOSIS — Z23 NEED FOR VACCINATION: Primary | ICD-10-CM

## 2021-04-11 PROCEDURE — 0001A SARSCOV2 VAC 30MCG/0.3ML IM: CPT

## 2021-05-02 ENCOUNTER — IMMUNIZATION (OUTPATIENT)
Dept: LAB | Age: 66
End: 2021-05-02
Attending: HOSPITALIST
Payer: COMMERCIAL

## 2021-05-02 DIAGNOSIS — Z23 NEED FOR VACCINATION: Primary | ICD-10-CM

## 2021-05-02 PROCEDURE — 0002A SARSCOV2 VAC 30MCG/0.3ML IM: CPT

## 2021-05-03 ENCOUNTER — TELEPHONE (OUTPATIENT)
Dept: CARDIOLOGY | Age: 66
End: 2021-05-03

## 2021-05-08 ENCOUNTER — OFFICE VISIT (OUTPATIENT)
Dept: INTERNAL MEDICINE CLINIC | Facility: CLINIC | Age: 66
End: 2021-05-08

## 2021-05-08 VITALS
WEIGHT: 206 LBS | HEART RATE: 50 BPM | DIASTOLIC BLOOD PRESSURE: 74 MMHG | HEIGHT: 73 IN | SYSTOLIC BLOOD PRESSURE: 130 MMHG | RESPIRATION RATE: 16 BRPM | BODY MASS INDEX: 27.3 KG/M2

## 2021-05-08 DIAGNOSIS — I10 ESSENTIAL HYPERTENSION: ICD-10-CM

## 2021-05-08 DIAGNOSIS — R80.9 TYPE 2 DIABETES MELLITUS WITH MICROALBUMINURIA, WITHOUT LONG-TERM CURRENT USE OF INSULIN (HCC): Primary | ICD-10-CM

## 2021-05-08 DIAGNOSIS — E11.69 DYSLIPIDEMIA ASSOCIATED WITH TYPE 2 DIABETES MELLITUS (HCC): ICD-10-CM

## 2021-05-08 DIAGNOSIS — E11.29 TYPE 2 DIABETES MELLITUS WITH MICROALBUMINURIA, WITHOUT LONG-TERM CURRENT USE OF INSULIN (HCC): Primary | ICD-10-CM

## 2021-05-08 DIAGNOSIS — I25.10 ASHD (ARTERIOSCLEROTIC HEART DISEASE): ICD-10-CM

## 2021-05-08 DIAGNOSIS — E78.5 DYSLIPIDEMIA ASSOCIATED WITH TYPE 2 DIABETES MELLITUS (HCC): ICD-10-CM

## 2021-05-08 PROCEDURE — 3044F HG A1C LEVEL LT 7.0%: CPT | Performed by: INTERNAL MEDICINE

## 2021-05-08 PROCEDURE — 3075F SYST BP GE 130 - 139MM HG: CPT | Performed by: INTERNAL MEDICINE

## 2021-05-08 PROCEDURE — 36416 COLLJ CAPILLARY BLOOD SPEC: CPT | Performed by: INTERNAL MEDICINE

## 2021-05-08 PROCEDURE — 3078F DIAST BP <80 MM HG: CPT | Performed by: INTERNAL MEDICINE

## 2021-05-08 PROCEDURE — 83036 HEMOGLOBIN GLYCOSYLATED A1C: CPT | Performed by: INTERNAL MEDICINE

## 2021-05-08 PROCEDURE — 3008F BODY MASS INDEX DOCD: CPT | Performed by: INTERNAL MEDICINE

## 2021-05-08 PROCEDURE — 99214 OFFICE O/P EST MOD 30 MIN: CPT | Performed by: INTERNAL MEDICINE

## 2021-05-08 NOTE — PROGRESS NOTES
Ronit Gu is a 60year old male. Patient presents with:  Diabetes    HPI:   Veronika Simpson presents this morning for follow-up of chronic medical conditions including ASHD, type 2 diabetes, hypertension and dyslipidemia. Lately he has been feeling well.   No sp diaphragmatic akinesia, EF 40-45%, mild MR, 70-80% distal LAD, 90% proximal LCx, 80-90% distal LCx, occlusion proximal RI, 99% distal RCA, s/p KATIE RCA LCx and OM; Echo 10-20 with EF 70%   • Diabetic nephropathy (HCC)     Microalbuminuria   • Dyslipidemia a MD  5/8/2021  9:12 AM

## 2021-05-08 NOTE — PATIENT INSTRUCTIONS
Colette Clemens, your blood pressure today was excellent at 130/74, and your glycohemoglobin level was also excellent at 6.0% (target 7.0% and below). Your diabetes and hypertension are both doing very well.   Please schedule an eye exam.  Continue current medicatio

## 2021-05-09 RX ORDER — LOSARTAN POTASSIUM 50 MG/1
50 TABLET ORAL DAILY
Qty: 90 TABLET | Refills: 1 | Status: SHIPPED | OUTPATIENT
Start: 2021-05-09 | End: 2021-11-03

## 2021-06-03 ENCOUNTER — TELEPHONE (OUTPATIENT)
Dept: CARDIOLOGY | Age: 66
End: 2021-06-03

## 2021-06-11 ENCOUNTER — TELEPHONE (OUTPATIENT)
Dept: CARDIOLOGY | Age: 66
End: 2021-06-11

## 2021-06-11 DIAGNOSIS — I25.10 CORONARY ARTERY DISEASE INVOLVING NATIVE CORONARY ARTERY OF NATIVE HEART WITHOUT ANGINA PECTORIS: Primary | ICD-10-CM

## 2021-06-14 RX ORDER — ATORVASTATIN CALCIUM 80 MG/1
80 TABLET, FILM COATED ORAL NIGHTLY
Qty: 90 TABLET | Refills: 0 | Status: SHIPPED | OUTPATIENT
Start: 2021-06-14 | End: 2021-09-10

## 2021-06-24 ENCOUNTER — ORDER TRANSCRIPTION (OUTPATIENT)
Dept: ADMINISTRATIVE | Facility: HOSPITAL | Age: 66
End: 2021-06-24

## 2021-06-24 DIAGNOSIS — Z01.818 PREOP EXAMINATION: Primary | ICD-10-CM

## 2021-06-24 DIAGNOSIS — Z11.59 ENCOUNTER FOR SCREENING FOR OTHER VIRAL DISEASES: ICD-10-CM

## 2021-06-28 ENCOUNTER — PATIENT MESSAGE (OUTPATIENT)
Dept: CARDIOLOGY CLINIC | Facility: CLINIC | Age: 66
End: 2021-06-28

## 2021-06-29 NOTE — TELEPHONE ENCOUNTER
From: Erick White  To: Loreen Kayser, MD  Sent: 6/28/2021 9:03 PM CDT  Subject: Non-Urgent Medical Question    While scheduling the nuclear stress test for Saturday 7/3/21 I was advised to check with Dr. Luli Albert if there are any special instructions regardi

## 2021-06-30 ENCOUNTER — LAB ENCOUNTER (OUTPATIENT)
Dept: LAB | Facility: HOSPITAL | Age: 66
End: 2021-06-30
Attending: INTERNAL MEDICINE
Payer: COMMERCIAL

## 2021-06-30 DIAGNOSIS — Z11.59 ENCOUNTER FOR SCREENING FOR OTHER VIRAL DISEASES: ICD-10-CM

## 2021-06-30 DIAGNOSIS — Z01.818 PREOP EXAMINATION: ICD-10-CM

## 2021-06-30 LAB — SARS-COV-2 RNA RESP QL NAA+PROBE: NOT DETECTED

## 2021-07-02 ENCOUNTER — TELEPHONE (OUTPATIENT)
Dept: CASE MANAGEMENT | Age: 66
End: 2021-07-02

## 2021-07-03 ENCOUNTER — HOSPITAL ENCOUNTER (OUTPATIENT)
Dept: NUCLEAR MEDICINE | Facility: HOSPITAL | Age: 66
Discharge: HOME | End: 2021-07-03
Attending: INTERNAL MEDICINE
Payer: COMMERCIAL

## 2021-07-03 ENCOUNTER — HOSPITAL ENCOUNTER (OUTPATIENT)
Dept: CV DIAGNOSTICS | Facility: HOSPITAL | Age: 66
Discharge: HOME | End: 2021-07-03
Attending: INTERNAL MEDICINE
Payer: COMMERCIAL

## 2021-07-03 ENCOUNTER — PATIENT MESSAGE (OUTPATIENT)
Dept: INTERNAL MEDICINE CLINIC | Facility: CLINIC | Age: 66
End: 2021-07-03

## 2021-07-03 ENCOUNTER — APPOINTMENT (OUTPATIENT)
Dept: NUCLEAR MEDICINE | Facility: HOSPITAL | Age: 66
End: 2021-07-03
Attending: INTERNAL MEDICINE
Payer: COMMERCIAL

## 2021-07-03 DIAGNOSIS — I25.10 CORONARY ARTERY DISEASE INVOLVING NATIVE CORONARY ARTERY OF NATIVE HEART WITHOUT ANGINA PECTORIS: ICD-10-CM

## 2021-07-03 PROCEDURE — 93017 CV STRESS TEST TRACING ONLY: CPT | Performed by: INTERNAL MEDICINE

## 2021-07-03 PROCEDURE — 93018 CV STRESS TEST I&R ONLY: CPT | Performed by: INTERNAL MEDICINE

## 2021-07-03 PROCEDURE — 93016 CV STRESS TEST SUPVJ ONLY: CPT | Performed by: INTERNAL MEDICINE

## 2021-07-03 PROCEDURE — 78452 HT MUSCLE IMAGE SPECT MULT: CPT | Performed by: INTERNAL MEDICINE

## 2021-07-03 NOTE — IMAGING NOTE
Patient here in cardiac diagnostics for outpatient nuclear exercise stress test ordered by Dr. Viki Apley. ECG changes noted during test, asymptomatic. See report for details. Stat nuclear read requested due to above.   Received phone call from Dr. Guera Lara with

## 2021-07-04 NOTE — TELEPHONE ENCOUNTER
From: Chanell Castrejon  To: Aramis Carbajal MD  Sent: 7/3/2021 9:50 PM CDT  Subject: Referral Request    Would like to request a referral for follow up appointment with Dr. Pankaj Jenkins.   Thank you

## 2021-07-04 NOTE — TELEPHONE ENCOUNTER
From: Fletcher Neal  To: Ana Laura Saleh MD  Sent: 7/3/2021 10:54 PM CDT  Subject: Referral Request    I would like to request a referral for a follow up with Dr Britney Tubbs for test results of nuclear stress test done today 7/3.  This was ordered by Dr Britney Tubbs due

## 2021-07-06 ENCOUNTER — TELEPHONE (OUTPATIENT)
Dept: CARDIOLOGY | Age: 66
End: 2021-07-06

## 2021-07-08 ENCOUNTER — PATIENT MESSAGE (OUTPATIENT)
Dept: INTERNAL MEDICINE CLINIC | Facility: CLINIC | Age: 66
End: 2021-07-08

## 2021-07-08 NOTE — TELEPHONE ENCOUNTER
From: Darius Draper  To: Júnior Landers MD  Sent: 7/8/2021 8:18 AM CDT  Subject: Non-Urgent Medical Question    I would like to request an order to update my Tdap vaccine.   Thank you

## 2021-07-14 ENCOUNTER — NURSE ONLY (OUTPATIENT)
Dept: INTERNAL MEDICINE CLINIC | Facility: CLINIC | Age: 66
End: 2021-07-14

## 2021-07-14 DIAGNOSIS — Z00.00 PREVENTATIVE HEALTH CARE: Primary | ICD-10-CM

## 2021-07-14 PROCEDURE — 90715 TDAP VACCINE 7 YRS/> IM: CPT | Performed by: INTERNAL MEDICINE

## 2021-07-14 PROCEDURE — 90471 IMMUNIZATION ADMIN: CPT | Performed by: INTERNAL MEDICINE

## 2021-07-14 NOTE — PROGRESS NOTES
Patient came in for nurse visit, I verified orders and name/. Pt was administered tdap on left deltoid. Pt tolerated injection, no reactions noted.

## 2021-07-28 ENCOUNTER — TELEPHONE (OUTPATIENT)
Dept: CASE MANAGEMENT | Age: 66
End: 2021-07-28

## 2021-07-28 NOTE — TELEPHONE ENCOUNTER
Patient is due for DM eye exam.   Referral authorized for Dr Vimal Brambila. Left message to call back [phone].

## 2021-08-24 RX ORDER — METOPROLOL TARTRATE 50 MG/1
TABLET, FILM COATED ORAL
Qty: 180 TABLET | Refills: 0 | Status: SHIPPED | OUTPATIENT
Start: 2021-08-24 | End: 2021-11-24

## 2021-08-24 RX ORDER — METOPROLOL TARTRATE 50 MG/1
50 TABLET, FILM COATED ORAL 2 TIMES DAILY
Qty: 180 TABLET | Refills: 0 | OUTPATIENT
Start: 2021-08-24

## 2021-09-10 RX ORDER — ATORVASTATIN CALCIUM 80 MG/1
80 TABLET, FILM COATED ORAL NIGHTLY
Qty: 90 TABLET | Refills: 0 | Status: SHIPPED | OUTPATIENT
Start: 2021-09-10 | End: 2021-12-15

## 2021-09-17 ENCOUNTER — TELEPHONE (OUTPATIENT)
Dept: CASE MANAGEMENT | Age: 66
End: 2021-09-17

## 2021-09-17 NOTE — TELEPHONE ENCOUNTER
Patient is due for DM eye exam. Referral written 5/8/21 for Dr Esperanza Hayden. Left message to call back [phone].

## 2021-09-22 NOTE — TELEPHONE ENCOUNTER
1st attempt - [employer] message sent to patient to contact the office to schedule appointment.

## 2021-09-23 ENCOUNTER — TELEPHONE (OUTPATIENT)
Dept: INTERNAL MEDICINE CLINIC | Facility: CLINIC | Age: 66
End: 2021-09-23

## 2021-09-24 NOTE — TELEPHONE ENCOUNTER
Please review; protocol failed. Requested Prescriptions   Pending Prescriptions Disp Refills    metFORMIN HCl 1000 MG Oral Tab 180 tablet 0     Sig: Take 1 tablet (1,000 mg total) by mouth 2 (two) times daily with meals.         Diabetes Medication Protocol Failed - 9/23/2021  5:44 PM        Failed - GFR in the past 12 months        Passed - Last A1C < 7.5 and within past 6 months     Lab Results   Component Value Date    A1C 6.0 (A) 05/08/2021               Passed - Appointment in past 6 or next 3 months        Passed - GFR Non- > 50     Lab Results   Component Value Date    GFRNAA 65 08/29/2020                           Recent Outpatient Visits              2 months ago [facility]    [facility], [address], [place]    Nurse Only    4 months ago Type 2 diabetes mellitus with microalbuminuria, without long-term current use of insulin [facility])    Marichuy Lindsey MD    Office Visit    1 year ago Annual physical exam    Ricardo Garvin MD    Office Visit    1 year ago ASHD (arteriosclerotic heart disease)    [place] [facility], [address], Marichuy Mcclain MD    Virtual Phone E/M    1 year ago Type 2 diabetes mellitus with microalbuminuria, without long-term current use of insulin [facility])    [facility], [address]ichuy MD    Office Visit

## 2021-10-12 ENCOUNTER — TELEPHONE (OUTPATIENT)
Dept: INTERNAL MEDICINE CLINIC | Facility: CLINIC | Age: 66
End: 2021-10-12

## 2021-11-03 RX ORDER — LOSARTAN POTASSIUM 50 MG/1
50 TABLET ORAL DAILY
Qty: 90 TABLET | Refills: 0 | Status: SHIPPED | OUTPATIENT
Start: 2021-11-03 | End: 2022-02-12

## 2021-11-03 NOTE — TELEPHONE ENCOUNTER
Please review. Protocol Failed / No Protocol. Requested Prescriptions   Pending Prescriptions Disp Refills    losartan 50 MG Oral Tab 90 tablet 1     Sig: Take 1 tablet (50 mg total) by mouth daily.         Hypertensive Medications Protocol Failed - 11/3/2021  5:35 AM        Failed - CMP or BMP in past 12 months        Passed - Appointment in past 6 or next 3 months        Passed - GFR Non- > 50     Lab Results   Component Value Date    GFRNAA 65 08/29/2020                       Recent Outpatient Visits              3 months ago Preventative health care    [facility], [address], [place]    Nurse Only    5 months ago Type 2 diabetes mellitus with microalbuminuria, without long-term current use of insulin [facility])    [facility], Elie Hinkle MD    Office Visit    1 year ago Annual physical exam    Alison Garibay MD    Office Visit    1 year ago ASHD (arteriosclerotic heart disease)    [facility], [address], Elie Hinkle MD    Virtual Phone E/M    1 year ago Type 2 diabetes mellitus with microalbuminuria, without long-term current use of insulin [facility])    [address] [place], [address], Elie Hinkle MD    Office Visit

## 2021-11-24 RX ORDER — METOPROLOL TARTRATE 50 MG/1
50 TABLET, FILM COATED ORAL 2 TIMES DAILY
Qty: 180 TABLET | Refills: 1 | Status: SHIPPED | OUTPATIENT
Start: 2021-11-24 | End: 2022-05-29

## 2021-11-24 NOTE — TELEPHONE ENCOUNTER
Please review. Protocol failed/ No protocol      Requested Prescriptions   Pending Prescriptions Disp Refills    ticagrelor (BRILINTA) 90 MG Oral Tab 180 tablet 1     Sig: Take 1 tablet (90 mg total) by mouth Q12H. There is no refill protocol information for this order        metoprolol tartrate 50 MG Oral Tab 180 tablet 0     Sig: Take 1 tablet (50 mg total) by mouth 2 (two) times daily.         Hypertensive Medications Protocol Failed - 11/24/2021  7:44 AM        Failed - CMP or BMP in past 12 months        Failed - Appointment in past 6 or next 3 months        Passed - GFR Non- > 50     Lab Results   Component Value Date    GFRNAA 72 08/29/2020                       Future Appointments         Provider Department Appt Notes    In 1 week Pebbles Ortega MD [facility] Eyal [facility] [place] Follow up for med refills             Recent Outpatient Visits              4 months ago [facility] health care    [address] [place] [address] Eyal Sch[address]    Nurse Only    6 months ago Type 2 diabetes mellitus with microalbuminuria, without long-term current use of insulin [facility])    [facility], Leanor Hashimoto, MD    Office Visit    1 year ago Annual physical exam    Dhara Billingsley MD    Office Visit    1 year ago ASHD (arteriosclerotic heart disease)    Dhara Billingsley MD    Virtual Phone E/M    1 year ago Type 2 diabetes mellitus with microalbuminuria, without long-term current use of insulin [facility])    [facility] [address], [place], Leanor Hashimoto, MD    Office Visit

## 2021-12-04 ENCOUNTER — OFFICE VISIT (OUTPATIENT)
Dept: INTERNAL MEDICINE CLINIC | Facility: CLINIC | Age: 66
End: 2021-12-04

## 2021-12-04 ENCOUNTER — LAB ENCOUNTER (OUTPATIENT)
Dept: LAB | Facility: HOSPITAL | Age: 66
End: 2021-12-04
Attending: INTERNAL MEDICINE
Payer: COMMERCIAL

## 2021-12-04 VITALS
SYSTOLIC BLOOD PRESSURE: 136 MMHG | WEIGHT: 207.69 LBS | HEIGHT: 73 IN | HEART RATE: 48 BPM | DIASTOLIC BLOOD PRESSURE: 72 MMHG | BODY MASS INDEX: 27.53 KG/M2

## 2021-12-04 DIAGNOSIS — Z00.00 ANNUAL PHYSICAL EXAM: ICD-10-CM

## 2021-12-04 DIAGNOSIS — E11.21 DIABETIC NEPHROPATHY ASSOCIATED WITH TYPE 2 DIABETES MELLITUS (HCC): ICD-10-CM

## 2021-12-04 DIAGNOSIS — R80.9 TYPE 2 DIABETES MELLITUS WITH MICROALBUMINURIA, WITHOUT LONG-TERM CURRENT USE OF INSULIN (HCC): ICD-10-CM

## 2021-12-04 DIAGNOSIS — E11.29 TYPE 2 DIABETES MELLITUS WITH MICROALBUMINURIA, WITHOUT LONG-TERM CURRENT USE OF INSULIN (HCC): ICD-10-CM

## 2021-12-04 DIAGNOSIS — Z00.00 ANNUAL PHYSICAL EXAM: Primary | ICD-10-CM

## 2021-12-04 DIAGNOSIS — I10 ESSENTIAL HYPERTENSION: ICD-10-CM

## 2021-12-04 DIAGNOSIS — E11.69 DYSLIPIDEMIA ASSOCIATED WITH TYPE 2 DIABETES MELLITUS (HCC): ICD-10-CM

## 2021-12-04 DIAGNOSIS — I25.10 ASHD (ARTERIOSCLEROTIC HEART DISEASE): ICD-10-CM

## 2021-12-04 DIAGNOSIS — E78.5 DYSLIPIDEMIA ASSOCIATED WITH TYPE 2 DIABETES MELLITUS (HCC): ICD-10-CM

## 2021-12-04 PROCEDURE — 3008F BODY MASS INDEX DOCD: CPT | Performed by: INTERNAL MEDICINE

## 2021-12-04 PROCEDURE — 80061 LIPID PANEL: CPT

## 2021-12-04 PROCEDURE — 85027 COMPLETE CBC AUTOMATED: CPT

## 2021-12-04 PROCEDURE — 36415 COLL VENOUS BLD VENIPUNCTURE: CPT

## 2021-12-04 PROCEDURE — 80053 COMPREHEN METABOLIC PANEL: CPT

## 2021-12-04 PROCEDURE — 83036 HEMOGLOBIN GLYCOSYLATED A1C: CPT

## 2021-12-04 PROCEDURE — 82043 UR ALBUMIN QUANTITATIVE: CPT

## 2021-12-04 PROCEDURE — 3078F DIAST BP <80 MM HG: CPT | Performed by: INTERNAL MEDICINE

## 2021-12-04 PROCEDURE — 82570 ASSAY OF URINE CREATININE: CPT

## 2021-12-04 PROCEDURE — 3075F SYST BP GE 130 - 139MM HG: CPT | Performed by: INTERNAL MEDICINE

## 2021-12-04 PROCEDURE — 99397 PER PM REEVAL EST PAT 65+ YR: CPT | Performed by: INTERNAL MEDICINE

## 2021-12-04 NOTE — PATIENT INSTRUCTIONS
Your blood pressure today was well controlled at 136/72 and your exam was normal.  Await results of today's blood and urine testing. Please get the Covid booster soon.   Please schedule an appointment with Ophthalmology for an eye exam.  Follow-up with Car

## 2021-12-04 NOTE — H&P
Zena Melendez is a 73year old male who presents for a complete physical exam, as well as for follow-up of chronic medical conditions. HPI:   His last physical was August 2020. He feels well. No specific issues for discussion today.     No home Accu-Chek o • aspirin 81 MG Oral Tab EC Take 1 tablet (81 mg total) by mouth daily.  With food 30 tablet 5   • Blood Glucose Monitoring Suppl (ACCU-CHEK COMPACT PLUS CARE) Does not apply Kit Check blood glucose daily,E11.9 1 kit 0   • Lancets Does not apply Misc Chec numbness or tingling in either foot    EXAM:   GENERAL: Pleasant male appearing well in no distress  /72   Pulse (!) 48   Ht 6' 1\" (1.854 m)   Wt 207 lb 11.2 oz (94.2 kg)   BMI 27.40 kg/m²   HEENT: Anicteric, conjunctiva pink  NECK: Supple without m INTERNAL    3. Diabetic nephropathy associated with type 2 diabetes mellitus ([facility]  Await labs    4. ASHD (arteriosclerotic heart disease)  Asymptomatic. Reinforced follow-up with Cardiology    5.  Essential hypertension  Well-controlled on current medicat

## 2021-12-16 RX ORDER — ATORVASTATIN CALCIUM 80 MG/1
80 TABLET, FILM COATED ORAL NIGHTLY
Qty: 90 TABLET | Refills: 1 | Status: SHIPPED | OUTPATIENT
Start: 2021-12-16 | End: 2022-06-15

## 2021-12-16 NOTE — TELEPHONE ENCOUNTER
Refill passed per [facility] protocol. Requested Prescriptions   Pending Prescriptions Disp Refills    atorvastatin 80 MG Oral Tab 90 tablet 0     Sig: Take 1 tablet (80 mg total) by mouth nightly. STOP AND.  CALL IF MUSCLE WEAKNESS        Cholesterol Medication Protocol Passed - 12/15/2021  7:10 PM        Passed - ALT in past 12 months        Passed - LDL in past 12 months        Passed - Last ALT < 80       Lab Results   Component Value Date    ALT 41 12/04/2021             Passed - Last LDL < 130     Lab Results   Component Value Date    LDL 37 12/04/2021               Passed - Appointment in past 12 or next 3 months                Recent Outpatient Visits              1 week ago Annual physical exam    Paty Israel MD    Office Visit    5 months ago [facility] health care    [place] [facility], [address], [employer]    Nurse Only    7 months ago Type 2 diabetes mellitus with microalbuminuria, without long-term current use of insulin [facility])    [place] [facility], [address], Paty Hines MD    Office Visit    1 year ago Annual physical exam    Osiris Philip MD    Office Visit    1 year ago ASHD (arteriosclerotic heart disease)    [place] [employer] [facility], [address], Paty Hines MD    Virtual Phone E/M             Future Appointments         Provider Department Appt Notes    In 3 weeks [address] [place] [address]

## 2021-12-25 NOTE — TELEPHONE ENCOUNTER
Refill passed per [facility] protocol    Requested Prescriptions   Pending Prescriptions Disp Refills    metFORMIN HCl 1000 MG Oral Tab 180 tablet 0     Sig: Take 1 tablet (1,000 mg total) by mouth 2 (two) times daily with meals.         Diabetes Medic

## 2022-02-14 RX ORDER — LOSARTAN POTASSIUM 50 MG/1
50 TABLET ORAL DAILY
Qty: 90 TABLET | Refills: 0 | Status: SHIPPED | OUTPATIENT
Start: 2022-02-14 | End: 2022-05-16

## 2022-02-14 NOTE — TELEPHONE ENCOUNTER
Refill passed per [employer] protocol. Requested Prescriptions   Pending Prescriptions Disp Refills    losartan 50 MG Oral Tab 90 tablet 0     Sig: Take 1 tablet (50 mg total) by mouth daily.         Hypertensive Medications Protocol Passed - 2/12/2022  2:38 PM        Passed - CMP or BMP in past 12 months        Passed - Appointment in past 6 or next 3 months        Passed - GFR Non- > 50     Lab Results   Component Value Date    GFRNAA 87 12/04/2021                           Recent Outpatient Visits              2 months ago Annual physical exam    Grant Washburn MD    Office Visit    7 months ago Preventative health care    [place] [facility], [address], [facility]    Nurse Only    9 months ago Type 2 diabetes mellitus with microalbuminuria, without long-term current use of insulin [facility])    [facility], [address], Geni Hamm MD    Office Visit    1 year ago Annual physical exam    Grant Washburn MD    Office Visit    1 year ago ASHD (arteriosclerotic heart disease)    [facility], Geni Hamm MD    [employer] [facility]

## 2022-05-15 ENCOUNTER — PATIENT MESSAGE (OUTPATIENT)
Dept: INTERNAL MEDICINE CLINIC | Facility: CLINIC | Age: 67
End: 2022-05-15

## 2022-05-16 RX ORDER — LOSARTAN POTASSIUM 50 MG/1
50 TABLET ORAL DAILY
Qty: 90 TABLET | Refills: 1 | Status: SHIPPED | OUTPATIENT
Start: 2022-05-16 | End: 2022-11-18

## 2022-05-16 RX ORDER — LOSARTAN POTASSIUM 50 MG/1
50 TABLET ORAL DAILY
Qty: 90 TABLET | Refills: 0 | OUTPATIENT
Start: 2022-05-16

## 2022-05-16 NOTE — TELEPHONE ENCOUNTER
From: Michele Velasco  To: Amelia Barros MD  Sent: 5/15/2022 5:32 PM CDT  Subject: Cardiology Appt    I am due for a follow up with Dr Chemo Joaquin. If Dr Chemo Joaquin and [facility] cardiology is still in network please provide a referral. If not please provide referral with in network provider.  Thank you

## 2022-05-30 RX ORDER — METOPROLOL TARTRATE 50 MG/1
50 TABLET, FILM COATED ORAL 2 TIMES DAILY
Qty: 180 TABLET | Refills: 1 | Status: SHIPPED | OUTPATIENT
Start: 2022-05-30

## 2022-05-30 NOTE — TELEPHONE ENCOUNTER
Please review; protocol failed/no protocol. Requested Prescriptions   Pending Prescriptions Disp Refills    ticagrelor (BRILINTA) 90 MG Oral Tab 180 tablet 1     Sig: Take 1 tablet (90 mg total) by mouth Q12H. There is no refill protocol information for this order       Signed Prescriptions Disp Refills    metoprolol tartrate 50 MG Oral Tab 180 tablet 1     Sig: Take 1 tablet (50 mg total) by mouth 2 (two) times daily.         Hypertensive Medications Protocol Passed - 5/30/2022  9:27 AM        Passed - CMP or BMP in past 12 months        Passed - Appointment in past 6 or next 3 months        Passed - GFR Non- > 50     Lab Results   Component Value Date    GFRNAA 87 12/04/2021                        Recent Outpatient Visits              5 months ago Annual physical exam    Nica Dominguez MD    Office Visit    10 months ago Preventative health care    [facility], [address], [place]    Nurse Only    1 year ago Type 2 diabetes mellitus with microalbuminuria, without long-term current use of insulin [facility])    [facility], [address], Gilbert Goodrich MD    Office Visit    1 year ago Annual physical exam    Nica Dominguez MD    Office Visit    1 year ago ASHD (arteriosclerotic heart disease)    [facility], [address], Gilbert Goodrich MD    [employer] [facility]

## 2022-05-30 NOTE — TELEPHONE ENCOUNTER
Refill passed per [place] [facility] protocol. Requested Prescriptions   Pending Prescriptions Disp Refills    ticagrelor (BRILINTA) 90 MG Oral Tab 180 tablet 1     Sig: Take 1 tablet (90 mg total) by mouth Q12H. There is no refill protocol information for this order        metoprolol tartrate 50 MG Oral Tab 180 tablet 1     Sig: Take 1 tablet (50 mg total) by mouth 2 (two) times daily.         Hypertensive Medications Protocol Passed - 5/30/2022  9:27 AM        Passed - CMP or BMP in past 12 months        Passed - Appointment in past 6 or next 3 months        Passed - GFR Non- > 50     Lab Results   Component Value Date    GFRNAA 87 12/04/2021                             Recent Outpatient Visits              5 months ago Annual physical exam    Rupinder Jeong MD    Office Visit    10 months ago Preventative health care    [facility], [address], [facility]    Nurse Only    1 year ago Type 2 diabetes mellitus with microalbuminuria, without long-term current use of insulin [facility])    [facility], [address], Jami Azevedo MD    Office Visit    1 year ago Annual physical exam    Rupinder Jeong MD    Office Visit    1 year ago ASHD (arteriosclerotic heart disease)    [facility], [address], Jami Azevedo MD    [employer] [facility]

## 2022-06-14 ENCOUNTER — OFFICE VISIT (OUTPATIENT)
Dept: INTERNAL MEDICINE CLINIC | Facility: CLINIC | Age: 67
End: 2022-06-14
Payer: COMMERCIAL

## 2022-06-14 ENCOUNTER — LAB ENCOUNTER (OUTPATIENT)
Dept: LAB | Facility: HOSPITAL | Age: 67
End: 2022-06-14
Attending: INTERNAL MEDICINE
Payer: COMMERCIAL

## 2022-06-14 VITALS
BODY MASS INDEX: 28.06 KG/M2 | SYSTOLIC BLOOD PRESSURE: 114 MMHG | WEIGHT: 211.69 LBS | DIASTOLIC BLOOD PRESSURE: 66 MMHG | HEART RATE: 61 BPM | HEIGHT: 73 IN

## 2022-06-14 DIAGNOSIS — Z00.00 ANNUAL PHYSICAL EXAM: ICD-10-CM

## 2022-06-14 DIAGNOSIS — I25.10 ASHD (ARTERIOSCLEROTIC HEART DISEASE): ICD-10-CM

## 2022-06-14 DIAGNOSIS — I10 ESSENTIAL HYPERTENSION: ICD-10-CM

## 2022-06-14 DIAGNOSIS — E78.5 DYSLIPIDEMIA ASSOCIATED WITH TYPE 2 DIABETES MELLITUS (HCC): ICD-10-CM

## 2022-06-14 DIAGNOSIS — E11.29 TYPE 2 DIABETES MELLITUS WITH MICROALBUMINURIA, WITHOUT LONG-TERM CURRENT USE OF INSULIN (HCC): ICD-10-CM

## 2022-06-14 DIAGNOSIS — E11.69 DYSLIPIDEMIA ASSOCIATED WITH TYPE 2 DIABETES MELLITUS (HCC): ICD-10-CM

## 2022-06-14 DIAGNOSIS — R80.9 TYPE 2 DIABETES MELLITUS WITH MICROALBUMINURIA, WITHOUT LONG-TERM CURRENT USE OF INSULIN (HCC): ICD-10-CM

## 2022-06-14 DIAGNOSIS — E11.21 DIABETIC NEPHROPATHY ASSOCIATED WITH TYPE 2 DIABETES MELLITUS (HCC): ICD-10-CM

## 2022-06-14 DIAGNOSIS — Z00.00 ANNUAL PHYSICAL EXAM: Primary | ICD-10-CM

## 2022-06-14 LAB
ALBUMIN SERPL-MCNC: 4 G/DL (ref 3.4–5)
ALBUMIN/GLOB SERPL: 1.3 {RATIO} (ref 1–2)
ALP LIVER SERPL-CCNC: 52 U/L
ALT SERPL-CCNC: 33 U/L
ANION GAP SERPL CALC-SCNC: 8 MMOL/L (ref 0–18)
AST SERPL-CCNC: 15 U/L (ref 15–37)
BILIRUB SERPL-MCNC: 0.7 MG/DL (ref 0.1–2)
BUN BLD-MCNC: 17 MG/DL (ref 7–18)
BUN/CREAT SERPL: 16.3 (ref 10–20)
CALCIUM BLD-MCNC: 9.6 MG/DL (ref 8.5–10.1)
CHLORIDE SERPL-SCNC: 107 MMOL/L (ref 98–112)
CHOLEST SERPL-MCNC: 96 MG/DL (ref ?–200)
CO2 SERPL-SCNC: 27 MMOL/L (ref 21–32)
COMPLEXED PSA SERPL-MCNC: 1.45 NG/ML (ref ?–4)
CREAT BLD-MCNC: 1.04 MG/DL
CREAT UR-SCNC: 90 MG/DL
DEPRECATED RDW RBC AUTO: 45.7 FL (ref 35.1–46.3)
ERYTHROCYTE [DISTWIDTH] IN BLOOD BY AUTOMATED COUNT: 13.8 % (ref 11–15)
EST. AVERAGE GLUCOSE BLD GHB EST-MCNC: 137 MG/DL (ref 68–126)
FASTING PATIENT LIPID ANSWER: YES
FASTING STATUS PATIENT QL REPORTED: YES
GLOBULIN PLAS-MCNC: 3.2 G/DL (ref 2.8–4.4)
GLUCOSE BLD-MCNC: 100 MG/DL (ref 70–99)
HBA1C MFR BLD: 6.4 % (ref ?–5.7)
HCT VFR BLD AUTO: 48.6 %
HDLC SERPL-MCNC: 39 MG/DL (ref 40–59)
HGB BLD-MCNC: 15.5 G/DL
LDLC SERPL CALC-MCNC: 39 MG/DL (ref ?–100)
MCH RBC QN AUTO: 28.8 PG (ref 26–34)
MCHC RBC AUTO-ENTMCNC: 31.9 G/DL (ref 31–37)
MCV RBC AUTO: 90.3 FL
MICROALBUMIN UR-MCNC: 1.43 MG/DL
MICROALBUMIN/CREAT 24H UR-RTO: 15.9 UG/MG (ref ?–30)
NONHDLC SERPL-MCNC: 57 MG/DL (ref ?–130)
OSMOLALITY SERPL CALC.SUM OF ELEC: 296 MOSM/KG (ref 275–295)
PLATELET # BLD AUTO: 168 10(3)UL (ref 150–450)
POTASSIUM SERPL-SCNC: 4.9 MMOL/L (ref 3.5–5.1)
PROT SERPL-MCNC: 7.2 G/DL (ref 6.4–8.2)
RBC # BLD AUTO: 5.38 X10(6)UL
SODIUM SERPL-SCNC: 142 MMOL/L (ref 136–145)
TRIGL SERPL-MCNC: 95 MG/DL (ref 30–149)
VLDLC SERPL CALC-MCNC: 13 MG/DL (ref 0–30)
WBC # BLD AUTO: 7 X10(3) UL (ref 4–11)

## 2022-06-14 PROCEDURE — 3078F DIAST BP <80 MM HG: CPT | Performed by: INTERNAL MEDICINE

## 2022-06-14 PROCEDURE — 80053 COMPREHEN METABOLIC PANEL: CPT

## 2022-06-14 PROCEDURE — 3074F SYST BP LT 130 MM HG: CPT | Performed by: INTERNAL MEDICINE

## 2022-06-14 PROCEDURE — 36415 COLL VENOUS BLD VENIPUNCTURE: CPT

## 2022-06-14 PROCEDURE — 82043 UR ALBUMIN QUANTITATIVE: CPT

## 2022-06-14 PROCEDURE — 82570 ASSAY OF URINE CREATININE: CPT

## 2022-06-14 PROCEDURE — 83036 HEMOGLOBIN GLYCOSYLATED A1C: CPT

## 2022-06-14 PROCEDURE — 80061 LIPID PANEL: CPT

## 2022-06-14 PROCEDURE — 3008F BODY MASS INDEX DOCD: CPT | Performed by: INTERNAL MEDICINE

## 2022-06-14 PROCEDURE — 85027 COMPLETE CBC AUTOMATED: CPT

## 2022-06-14 PROCEDURE — 99397 PER PM REEVAL EST PAT 65+ YR: CPT | Performed by: INTERNAL MEDICINE

## 2022-06-14 NOTE — PATIENT INSTRUCTIONS
Your blood pressure today was excellent at 114/66 and your exam was normal.  Await results of today's blood and urine testing. Continue current medications. Please schedule an eye exam, and also follow-up with Cardiology. Return visit in 6 months.

## 2022-06-15 RX ORDER — ATORVASTATIN CALCIUM 80 MG/1
TABLET, FILM COATED ORAL
Qty: 90 TABLET | Refills: 1 | Status: SHIPPED | OUTPATIENT
Start: 2022-06-15

## 2022-11-01 ENCOUNTER — APPOINTMENT (OUTPATIENT)
Dept: GENERAL RADIOLOGY | Facility: HOSPITAL | Age: 67
End: 2022-11-01
Payer: COMMERCIAL

## 2022-11-01 ENCOUNTER — HOSPITAL ENCOUNTER (EMERGENCY)
Facility: HOSPITAL | Age: 67
Discharge: HOME | End: 2022-11-02
Attending: EMERGENCY MEDICINE
Payer: COMMERCIAL

## 2022-11-01 VITALS
TEMPERATURE: 98 F | SYSTOLIC BLOOD PRESSURE: 131 MMHG | BODY MASS INDEX: 27.76 KG/M2 | RESPIRATION RATE: 12 BRPM | HEART RATE: 59 BPM | OXYGEN SATURATION: 95 % | WEIGHT: 214 LBS | DIASTOLIC BLOOD PRESSURE: 87 MMHG | HEIGHT: 73.5 IN

## 2022-11-01 DIAGNOSIS — R07.89 CHEST PAIN, ATYPICAL: Primary | ICD-10-CM

## 2022-11-01 LAB
ANION GAP SERPL CALC-SCNC: 6 MMOL/L (ref 0–18)
BASOPHILS # BLD AUTO: 0.02 X10(3) UL (ref 0–0.2)
BASOPHILS NFR BLD AUTO: 0.3 %
BUN BLD-MCNC: 15 MG/DL (ref 7–18)
BUN/CREAT SERPL: 16.1 (ref 10–20)
CALCIUM BLD-MCNC: 9.1 MG/DL (ref 8.5–10.1)
CHLORIDE SERPL-SCNC: 110 MMOL/L (ref 98–112)
CO2 SERPL-SCNC: 26 MMOL/L (ref 21–32)
CREAT BLD-MCNC: 0.93 MG/DL
D DIMER PPP FEU-MCNC: <0.27 UG/ML FEU (ref ?–0.66)
DEPRECATED RDW RBC AUTO: 44.3 FL (ref 35.1–46.3)
EOSINOPHIL # BLD AUTO: 0.2 X10(3) UL (ref 0–0.7)
EOSINOPHIL NFR BLD AUTO: 3.1 %
ERYTHROCYTE [DISTWIDTH] IN BLOOD BY AUTOMATED COUNT: 13.5 % (ref 11–15)
GFR SERPLBLD BASED ON 1.73 SQ M-ARVRAT: 91 ML/MIN/1.73M2 (ref 60–?)
GLUCOSE BLD-MCNC: 116 MG/DL (ref 70–99)
HCT VFR BLD AUTO: 47 %
HGB BLD-MCNC: 15.2 G/DL
IMM GRANULOCYTES # BLD AUTO: 0.02 X10(3) UL (ref 0–1)
IMM GRANULOCYTES NFR BLD: 0.3 %
LYMPHOCYTES # BLD AUTO: 1.38 X10(3) UL (ref 1–4)
LYMPHOCYTES NFR BLD AUTO: 21.4 %
MCH RBC QN AUTO: 29.1 PG (ref 26–34)
MCHC RBC AUTO-ENTMCNC: 32.3 G/DL (ref 31–37)
MCV RBC AUTO: 89.9 FL
MONOCYTES # BLD AUTO: 0.5 X10(3) UL (ref 0.1–1)
MONOCYTES NFR BLD AUTO: 7.7 %
NEUTROPHILS # BLD AUTO: 4.34 X10 (3) UL (ref 1.5–7.7)
NEUTROPHILS # BLD AUTO: 4.34 X10(3) UL (ref 1.5–7.7)
NEUTROPHILS NFR BLD AUTO: 67.2 %
OSMOLALITY SERPL CALC.SUM OF ELEC: 296 MOSM/KG (ref 275–295)
PLATELET # BLD AUTO: 181 10(3)UL (ref 150–450)
POTASSIUM SERPL-SCNC: 4.5 MMOL/L (ref 3.5–5.1)
RBC # BLD AUTO: 5.23 X10(6)UL
SODIUM SERPL-SCNC: 142 MMOL/L (ref 136–145)
TROPONIN I HIGH SENSITIVITY: 7 NG/L
TROPONIN I HIGH SENSITIVITY: 8 NG/L
WBC # BLD AUTO: 6.5 X10(3) UL (ref 4–11)

## 2022-11-01 PROCEDURE — 80048 BASIC METABOLIC PNL TOTAL CA: CPT

## 2022-11-01 PROCEDURE — 80048 BASIC METABOLIC PNL TOTAL CA: CPT | Performed by: EMERGENCY MEDICINE

## 2022-11-01 PROCEDURE — 84484 ASSAY OF TROPONIN QUANT: CPT

## 2022-11-01 PROCEDURE — 93005 ELECTROCARDIOGRAM TRACING: CPT

## 2022-11-01 PROCEDURE — 85379 FIBRIN DEGRADATION QUANT: CPT | Performed by: EMERGENCY MEDICINE

## 2022-11-01 PROCEDURE — 71045 X-RAY EXAM CHEST 1 VIEW: CPT

## 2022-11-01 PROCEDURE — 99285 EMERGENCY DEPT VISIT HI MDM: CPT

## 2022-11-01 PROCEDURE — 85025 COMPLETE CBC W/AUTO DIFF WBC: CPT

## 2022-11-01 PROCEDURE — 99284 EMERGENCY DEPT VISIT MOD MDM: CPT

## 2022-11-01 PROCEDURE — 36415 COLL VENOUS BLD VENIPUNCTURE: CPT

## 2022-11-01 PROCEDURE — 93010 ELECTROCARDIOGRAM REPORT: CPT | Performed by: EMERGENCY MEDICINE

## 2022-11-01 PROCEDURE — 84484 ASSAY OF TROPONIN QUANT: CPT | Performed by: EMERGENCY MEDICINE

## 2022-11-01 PROCEDURE — 85025 COMPLETE CBC W/AUTO DIFF WBC: CPT | Performed by: EMERGENCY MEDICINE

## 2022-11-02 NOTE — DISCHARGE INSTRUCTIONS
Return or call 911 for any further chest pain.   Call Dr. Jones Lynn office tomorrow for an appointment they will get you in this week continue your previously prescribed medications

## 2022-11-04 ENCOUNTER — PATIENT OUTREACH (OUTPATIENT)
Dept: CASE MANAGEMENT | Age: 67
End: 2022-11-04

## 2022-11-04 NOTE — PROGRESS NOTES
1st attempt; pt had recent ED visit, calling to offer PCP f/u apt (dc 11/1)      Dr. Liliane Montero  [address] [place] [address] [address] [address] [phone]    Spk to pt who sts he does not need assistance with scheduling     Closing encounter

## 2022-11-09 ENCOUNTER — HOSPITAL ENCOUNTER (OUTPATIENT)
Dept: NUCLEAR MEDICINE | Facility: HOSPITAL | Age: 67
Discharge: HOME | End: 2022-11-09
Attending: INTERNAL MEDICINE
Payer: COMMERCIAL

## 2022-11-09 ENCOUNTER — HOSPITAL ENCOUNTER (OUTPATIENT)
Dept: CV DIAGNOSTICS | Facility: HOSPITAL | Age: 67
Discharge: HOME | End: 2022-11-09
Attending: INTERNAL MEDICINE
Payer: COMMERCIAL

## 2022-11-09 DIAGNOSIS — I25.10 ASHD (ARTERIOSCLEROTIC HEART DISEASE): ICD-10-CM

## 2022-11-09 DIAGNOSIS — I25.10 CORONARY ARTERY DISEASE INVOLVING NATIVE CORONARY ARTERY OF NATIVE HEART WITHOUT ANGINA PECTORIS: ICD-10-CM

## 2022-11-09 PROCEDURE — 93018 CV STRESS TEST I&R ONLY: CPT | Performed by: INTERNAL MEDICINE

## 2022-11-09 PROCEDURE — 93017 CV STRESS TEST TRACING ONLY: CPT | Performed by: INTERNAL MEDICINE

## 2022-11-09 PROCEDURE — 78452 HT MUSCLE IMAGE SPECT MULT: CPT | Performed by: INTERNAL MEDICINE

## 2022-11-09 PROCEDURE — 93016 CV STRESS TEST SUPVJ ONLY: CPT | Performed by: INTERNAL MEDICINE

## 2022-11-09 NOTE — IMAGING NOTE
Patient is here for outpatient nuclear stress test ordered by Dr Lukas Chicas. Devika Ott. ECG changes noted during test, asymptomatic. See stress test  report for details  Stat nuclear read requested due to the ECG changes. Received a call from Dr Lennox Joseph with nuclear results. Please see report for details. 10:35am Dr Nguyen(cardiologist go to) notified and stress test results discussed. Cardiologist aware patient is asymptomatic. Received orders , patient  May be discharged to home and to follow up with Dr Devika Ott   Patient set up to see Dr Devika Ott this Friday 11/11/2022 at 3:50pm.Patient aware regarding follow up details . Saline lock removed by nuc technician. Site clean and dressings applied No signs of any bleeding noted  Discharged to home. Pain free        ACE-I orders (720h ago, onward)

## 2022-11-10 LAB
% OF MAX PREDICTED HR: 100 %
MAX DIASTOLIC BP: 90 MMHG
MAX HEART RATE: 155 BPM
MAX PREDICTED HEART RATE: 154 BPM
MAX SYSTOLIC BP: 194 MMHG
MAX WORK LOAD: 101

## 2022-11-18 RX ORDER — LOSARTAN POTASSIUM 50 MG/1
50 TABLET ORAL DAILY
Qty: 90 TABLET | Refills: 1 | Status: SHIPPED | OUTPATIENT
Start: 2022-11-18

## 2022-11-18 NOTE — TELEPHONE ENCOUNTER
Refill passed per [facility] protocol. Requested Prescriptions   Pending Prescriptions Disp Refills    LOSARTAN 50 MG Oral Tab [Pharmacy Med Name: LOSARTAN 50MG TABLETS] 90 tablet 1     Sig: TAKE 1 TABLET(50 MG) BY MOUTH DAILY       Hypertensive Medications Protocol Passed - 11/18/2022 12:39 PM        Passed - In person appointment in the past 12 or next 3 months     Recent Outpatient Visits              5 months ago Annual physical exam    [address] [facility] [address], Sridhar Morales MD    Office Visit    11 months ago Annual physical exam    [address] [facility], Sridhar oMrales MD    Office Visit    1 year ago Preventative health care    [facility], [address], Primo Weller    Nurse Only    1 year ago Type 2 diabetes mellitus with microalbuminuria, without long-term current use of insulin [facility])    [place] [facility], [address], Sridhar Morales MD    Office Visit    2 years ago Annual physical exam    Lynda Kimbrough MD    Office Visit          Future Appointments         Provider Department Appt Notes    In 3 weeks Jamel Hubbard MD [place] [facility], [address] [facility] 6 mo f/u - [email or web address] message sent to confirm location change.  8/8 NS               Passed - Last BP reading less than 140/90     BP Readings from Last 1 Encounters:  11/01/22 : 131/87              Passed - CMP or BMP in past 6 months     Recent Results (from the past 4392 hour(s))   Basic Metabolic Panel (8)    Collection Time: 11/01/22  6:49 PM   Result Value Ref Range    Glucose 116 (H) 70 - 99 mg/dL    Sodium 142 136 - 145 mmol/L    Potassium 4.5 3.5 - 5.1 mmol/L    Chloride 110 98 - 112 mmol/L    CO2 26.0 21.0 - 32.0 mmol/L    Anion Gap 6 0 - 18 mmol/L    BUN 15 7 - 18 mg/dL    Creatinine 0.93 0.70 - 1.30 mg/dL    BUN/CREA Ratio 16.1 10.0 - 20.0    Calcium, Total 9.1 8.5 - 10.1 mg/dL    Calculated Osmolality 296 (H) 275 - 295 mOsm/kg    eGFR-Cr 91 >=60 mL/min/1.73m2     *Note: Due to a large number of results and/or encounters for the requested time period, some results have not been displayed. A complete set of results can be found in Results Review. Passed - In person appointment or virtual visit in the past 6 months     Recent Outpatient Visits              5 months ago Annual physical exam    [address]    Office Visit    11 months ago Annual physical exam    [address], Callum Valdes MD    Office Visit    1 year ago [facility], [address], [place]    Nurse Only    1 year ago Type 2 diabetes mellitus with microalbuminuria, without long-term current use of insulin [facility])    [facility], [address]Callum MD    Office Visit    2 years ago Annual physical exam    Marina Taylor MD    Office Visit          Future Appointments         Provider Department Appt Notes    In 3 weeks [facility] Lipscomb MD [facility], [address] [facility] 6 mo f/u - [employer] message sent to confirm location change.  8/8 NS               Passed - EGFRCR or GFRNAA > 50     GFR Evaluation  EGFRCR: 91 , resulted on 11/1/2022               Recent Outpatient Visits              5 months ago Annual physical exam    [address] Collado MD    Office Visit    11 months ago Annual physical exam    Marina Taylor MD    Office Visit    1 year ago [facility], [address], [place]    Nurse Only    1 year ago Type 2 diabetes mellitus with microalbuminuria, without long-term current use of insulin [facility])    [facility], [address]Callum MD    Office Visit    2 years ago Annual physical exam    [address] Lambert MD    Office Visit            Future Appointments         Provider Department Appt Notes    In 3 weeks Boo Lipscomb MD [facility], [facility], [place] 6 mo f/u - [employer] message sent to confirm location change.  8/8 NS

## 2023-07-04 PROBLEM — I70.0 AORTIC ATHEROSCLEROSIS (HCC): Status: ACTIVE | Noted: 2023-07-04

## 2023-07-04 PROBLEM — I70.0 AORTIC ATHEROSCLEROSIS: Status: ACTIVE | Noted: 2023-07-04

## 2023-07-07 ENCOUNTER — OFFICE VISIT (OUTPATIENT)
Dept: INTERNAL MEDICINE CLINIC | Facility: CLINIC | Age: 68
End: 2023-07-07

## 2023-07-07 ENCOUNTER — LAB ENCOUNTER (OUTPATIENT)
Dept: LAB | Age: 68
End: 2023-07-07
Attending: INTERNAL MEDICINE
Payer: COMMERCIAL

## 2023-07-07 VITALS
WEIGHT: 221.19 LBS | DIASTOLIC BLOOD PRESSURE: 72 MMHG | HEART RATE: 60 BPM | SYSTOLIC BLOOD PRESSURE: 128 MMHG | HEIGHT: 73 IN | BODY MASS INDEX: 29.31 KG/M2

## 2023-07-07 DIAGNOSIS — E11.21 DIABETIC NEPHROPATHY ASSOCIATED WITH TYPE 2 DIABETES MELLITUS (HCC): ICD-10-CM

## 2023-07-07 DIAGNOSIS — Z00.00 ANNUAL PHYSICAL EXAM: ICD-10-CM

## 2023-07-07 DIAGNOSIS — I70.0 AORTIC ATHEROSCLEROSIS (HCC): ICD-10-CM

## 2023-07-07 DIAGNOSIS — E11.29 TYPE 2 DIABETES MELLITUS WITH MICROALBUMINURIA, WITHOUT LONG-TERM CURRENT USE OF INSULIN: ICD-10-CM

## 2023-07-07 DIAGNOSIS — E11.69 DYSLIPIDEMIA ASSOCIATED WITH TYPE 2 DIABETES MELLITUS: ICD-10-CM

## 2023-07-07 DIAGNOSIS — R80.9 TYPE 2 DIABETES MELLITUS WITH MICROALBUMINURIA, WITHOUT LONG-TERM CURRENT USE OF INSULIN: ICD-10-CM

## 2023-07-07 DIAGNOSIS — I10 ESSENTIAL HYPERTENSION: ICD-10-CM

## 2023-07-07 DIAGNOSIS — I25.10 ASHD (ARTERIOSCLEROTIC HEART DISEASE): ICD-10-CM

## 2023-07-07 DIAGNOSIS — Z00.00 ANNUAL PHYSICAL EXAM: Primary | ICD-10-CM

## 2023-07-07 DIAGNOSIS — E78.5 DYSLIPIDEMIA ASSOCIATED WITH TYPE 2 DIABETES MELLITUS: ICD-10-CM

## 2023-07-07 LAB
ALBUMIN SERPL-MCNC: 3.4 G/DL (ref 3.4–5)
ALBUMIN/GLOB SERPL: 1.1 {RATIO} (ref 1–2)
ALP LIVER SERPL-CCNC: 55 U/L
ALT SERPL-CCNC: 35 U/L
ANION GAP SERPL CALC-SCNC: 6 MMOL/L (ref 0–18)
AST SERPL-CCNC: 20 U/L (ref 15–37)
BILIRUB SERPL-MCNC: 0.6 MG/DL (ref 0.1–2)
BUN BLD-MCNC: 20 MG/DL (ref 7–18)
BUN/CREAT SERPL: 20.4 (ref 10–20)
CALCIUM BLD-MCNC: 8.8 MG/DL (ref 8.5–10.1)
CHLORIDE SERPL-SCNC: 112 MMOL/L (ref 98–112)
CHOLEST SERPL-MCNC: 90 MG/DL (ref ?–200)
CO2 SERPL-SCNC: 25 MMOL/L (ref 21–32)
COMPLEXED PSA SERPL-MCNC: 1.16 NG/ML (ref ?–4)
CREAT BLD-MCNC: 0.98 MG/DL
CREAT UR-SCNC: 148 MG/DL
DEPRECATED RDW RBC AUTO: 47.9 FL (ref 35.1–46.3)
ERYTHROCYTE [DISTWIDTH] IN BLOOD BY AUTOMATED COUNT: 14.3 % (ref 11–15)
EST. AVERAGE GLUCOSE BLD GHB EST-MCNC: 154 MG/DL (ref 68–126)
FASTING PATIENT LIPID ANSWER: YES
FASTING STATUS PATIENT QL REPORTED: YES
GFR SERPLBLD BASED ON 1.73 SQ M-ARVRAT: 85 ML/MIN/1.73M2 (ref 60–?)
GLOBULIN PLAS-MCNC: 3 G/DL (ref 2.8–4.4)
GLUCOSE BLD-MCNC: 138 MG/DL (ref 70–99)
HBA1C MFR BLD: 7 % (ref ?–5.7)
HCT VFR BLD AUTO: 44.6 %
HDLC SERPL-MCNC: 34 MG/DL (ref 40–59)
HGB BLD-MCNC: 14.2 G/DL
LDLC SERPL CALC-MCNC: 38 MG/DL (ref ?–100)
MCH RBC QN AUTO: 29.2 PG (ref 26–34)
MCHC RBC AUTO-ENTMCNC: 31.8 G/DL (ref 31–37)
MCV RBC AUTO: 91.6 FL
MICROALBUMIN UR-MCNC: 3.64 MG/DL
MICROALBUMIN/CREAT 24H UR-RTO: 24.6 UG/MG (ref ?–30)
NONHDLC SERPL-MCNC: 56 MG/DL (ref ?–130)
OSMOLALITY SERPL CALC.SUM OF ELEC: 301 MOSM/KG (ref 275–295)
PLATELET # BLD AUTO: 163 10(3)UL (ref 150–450)
POTASSIUM SERPL-SCNC: 4.2 MMOL/L (ref 3.5–5.1)
PROT SERPL-MCNC: 6.4 G/DL (ref 6.4–8.2)
RBC # BLD AUTO: 4.87 X10(6)UL
SODIUM SERPL-SCNC: 143 MMOL/L (ref 136–145)
TRIGL SERPL-MCNC: 88 MG/DL (ref 30–149)
VLDLC SERPL CALC-MCNC: 12 MG/DL (ref 0–30)
WBC # BLD AUTO: 5.3 X10(3) UL (ref 4–11)

## 2023-07-07 PROCEDURE — 82043 UR ALBUMIN QUANTITATIVE: CPT

## 2023-07-07 PROCEDURE — 3074F SYST BP LT 130 MM HG: CPT | Performed by: INTERNAL MEDICINE

## 2023-07-07 PROCEDURE — 99397 PER PM REEVAL EST PAT 65+ YR: CPT | Performed by: INTERNAL MEDICINE

## 2023-07-07 PROCEDURE — 36415 COLL VENOUS BLD VENIPUNCTURE: CPT

## 2023-07-07 PROCEDURE — 80053 COMPREHEN METABOLIC PANEL: CPT

## 2023-07-07 PROCEDURE — 85027 COMPLETE CBC AUTOMATED: CPT

## 2023-07-07 PROCEDURE — 82570 ASSAY OF URINE CREATININE: CPT

## 2023-07-07 PROCEDURE — 3008F BODY MASS INDEX DOCD: CPT | Performed by: INTERNAL MEDICINE

## 2023-07-07 PROCEDURE — 80061 LIPID PANEL: CPT

## 2023-07-07 PROCEDURE — 3078F DIAST BP <80 MM HG: CPT | Performed by: INTERNAL MEDICINE

## 2023-07-07 PROCEDURE — 83036 HEMOGLOBIN GLYCOSYLATED A1C: CPT

## 2023-07-07 NOTE — PATIENT INSTRUCTIONS
Your blood pressure today was good at 128/72 and your exam was normal.  Await results of today's blood and urine testing. Please schedule an eye exam.  Continue current medication, healthy diet and regular walking. Return visit in 6 months.

## 2023-07-20 NOTE — TELEPHONE ENCOUNTER
Current Outpatient Medications:       ticagrelor (BRILINTA) 90 MG Oral Tab, Take 1 tablet (90 mg total) by mouth Q12H., Disp: 60 tablet, Rfl: 0

## 2023-11-16 NOTE — TELEPHONE ENCOUNTER
Spouse, is requesting a refill for the patient's losartan medication. Pharmacy; [facility] (Listed)     Current Outpatient Medications   Medication Sig Dispense Refill    losartan 50 MG Oral Tab Take 1 tablet (50 mg total) by mouth daily.  90 tablet 1

## 2023-11-17 RX ORDER — LOSARTAN POTASSIUM 50 MG/1
50 TABLET ORAL DAILY
Qty: 90 TABLET | Refills: 3 | Status: SHIPPED | OUTPATIENT
Start: 2023-11-17

## 2023-11-17 NOTE — TELEPHONE ENCOUNTER
Refill passed per [facility] protocol. Requested Prescriptions   Pending Prescriptions Disp Refills    losartan 50 MG Oral Tab 90 tablet 1     Sig: Take 1 tablet (50 mg total) by mouth daily.        Hypertensive Medications Protocol Passed - 11/17/2023 11:00 AM        Passed - In person appointment in the past 12 or next 3 months     Recent Outpatient Visits              4 months ago Annual physical exam    [facility], Leyda Garrido MD    Office Visit    1 year ago Annual physical exam    [address], [address], Leyda Garrido MD    Office Visit    1 year ago Annual physical exam    [address], [address], Leyda Garrido MD    Office Visit    2 years ago Preventative health care    [address], [address], [place]    Nurse Only    2 years ago Type 2 diabetes mellitus with microalbuminuria, without long-term current use of insulin ([facility]    [address], [address], Leyda Garrido MD    Office Visit                      Passed - Last BP reading less than 140/90     BP Readings from Last 1 Encounters:   07/07/23 128/72               Passed - CMP or BMP in past 6 months     Recent Results (from the past 4392 hour(s))   Comp Metabolic Panel (14)    Collection Time: 07/07/23  7:58 AM   Result Value Ref Range    Glucose 138 (H) 70 - 99 mg/dL    Sodium 143 136 - 145 mmol/L    Potassium 4.2 3.5 - 5.1 mmol/L    Chloride 112 98 - 112 mmol/L    CO2 25.0 21.0 - 32.0 mmol/L    Anion Gap 6 0 - 18 mmol/L    BUN 20 (H) 7 - 18 mg/dL    Creatinine 0.98 0.70 - 1.30 mg/dL    BUN/CREA Ratio 20.4 (H) 10.0 - 20.0    Calcium, Total 8.8 8.5 - 10.1 mg/dL    Calculated Osmolality 301 (H) 275 - 295 mOsm/kg    eGFR-Cr 85 >=60 mL/min/1.73m2    ALT 35 16 - 61 U/L    AST 20 15 - 37 U/L    Alkaline Phosphatase 55 45 - 117 U/L    Bilirubin, Total 0.6 0.1 - 2.0 mg/dL    Total Protein 6.4 6.4 - 8.2 g/dL    Albumin 3.4 3.4 - 5.0 g/dL    Globulin  3.0 2.8 - 4.4 g/dL    A/G Ratio 1.1 1.0 - 2.0    Patient Fasting for CMP? Yes      *Note: Due to a large number of results and/or encounters for the requested time period, some results have not been displayed. A complete set of results can be found in Results Review.                Passed - In person appointment or virtual visit in the past 6 months     Recent Outpatient Visits              4 months ago Annual physical exam    Mitzi Leone MD    Office Visit    1 year ago Annual physical exam    Kindra Albert MD    Office Visit    1 year ago Annual physical exam    Kindra Albert MD    Office Visit    2 years ago [facility] health care    [address], [address], [place]    Nurse Only    2 years ago Type 2 diabetes mellitus with microalbuminuria, without long-term current use of insulin ([facility]    [address], [address] [facility],[address], Kindra Hernandez MD    Office Visit                      Passed - EGFRCR or GFRNAA > 50     GFR Evaluation  EGFRCR: 85 , resulted on 7/7/2023             Recent Outpatient Visits              4 months ago Annual physical exam    Mitzi Leone MD    Office Visit    1 year ago Annual physical exam    Kindra Albert MD    Office Visit    1 year ago Annual physical exam    Kindra Albert MD    Office Visit    2 years ago [facility] health care    [address], [address], [place]    Nurse Only    2 years ago Type 2 diabetes mellitus with microalbuminuria, without long-term current use of insulin ([address]    [address], [address] [facility], [facility] Brittany Peacock MD    Office Visit

## 2023-12-23 RX ORDER — METOPROLOL TARTRATE 50 MG/1
50 TABLET, FILM COATED ORAL 2 TIMES DAILY
Qty: 180 TABLET | Refills: 0 | Status: SHIPPED | OUTPATIENT
Start: 2023-12-23

## 2023-12-28 NOTE — TELEPHONE ENCOUNTER
2nd attempt/cannot leave a voice mail message. Message states that the voice mail box is full and cannot accept new messages.

## 2024-01-02 ENCOUNTER — TELEPHONE (OUTPATIENT)
Dept: CASE MANAGEMENT | Age: 69
End: 2024-01-02

## 2024-01-02 DIAGNOSIS — I25.10 ASHD (ARTERIOSCLEROTIC HEART DISEASE): Primary | ICD-10-CM

## 2024-01-02 NOTE — TELEPHONE ENCOUNTER
Dr. Burgos,     Patient needs referral for follow up visit with Dr. Lin. Prashant for appointment 1/2/24.     Pended referral please review diagnosis and sign off if you agree.    Thank you.  Bina Hart  [facility]

## 2024-01-19 ENCOUNTER — TELEPHONE (OUTPATIENT)
Dept: FAMILY MEDICINE CLINIC | Facility: CLINIC | Age: 69
End: 2024-01-19

## 2024-04-05 RX ORDER — METOPROLOL TARTRATE 50 MG/1
50 TABLET, FILM COATED ORAL 2 TIMES DAILY
Qty: 180 TABLET | Refills: 3 | Status: SHIPPED | OUTPATIENT
Start: 2024-04-05

## 2024-04-05 NOTE — TELEPHONE ENCOUNTER
Refill pass per protocol    Requested Prescriptions   Pending Prescriptions Disp Refills    METOPROLOL TARTRATE 50 MG Oral Tab [Pharmacy Med Name: METOPROLOL TARTRATE 50MG TABLETS] 180 tablet 0     Sig: TAKE 1 TABLET(50 MG) BY MOUTH TWICE DAILY       Hypertension Medications Protocol Passed - 4/5/2024 11:46 AM        Passed - CMP or BMP in past 12 months        Passed - Last BP reading less than 140/90     BP Readings from Last 1 Encounters:   07/07/23 128/72               Passed - In person appointment or virtual visit in the past 12 mos or appointment in next 3 mos     Recent Outpatient Visits              9 months ago [facility]    Office [facility]    1 year ago [facility]    Office Visit    2 years ago [facility] physical exam    [facility]    Office Visit    2 years ago [facility] health care    [facility]    Nurse Only    2 years ago Type 2 diabetes mellitus with microalbuminuria, without long-term current use of insulin ([facility])    [facility]    Office Visit                      Passed - EGFRCR or GFRNAA > 50     GFR Evaluation  EGFRCR: 85 , resulted on 7/7/2023

## 2024-05-01 ENCOUNTER — TELEPHONE (OUTPATIENT)
Dept: INTERNAL MEDICINE CLINIC | Facility: CLINIC | Age: 69
End: 2024-05-01

## 2024-05-02 NOTE — TELEPHONE ENCOUNTER
Please call patient to assist in making an appointment.  Thank you   (Patient is not active on MyChart)

## 2024-05-02 NOTE — TELEPHONE ENCOUNTER
Please review. Rx failed/no protocol.    Will route to [facility]Center to call patient and make an appointment.    Requested Prescriptions   Pending Prescriptions Disp Refills    METFORMIN HCL 1000 MG Oral Tab [Pharmacy Med Name: METFORMIN 1000MG TABLETS] 180 tablet 0     Sig: TAKE 1 TABLET(1000 MG) BY MOUTH TWICE DAILY WITH MEALS       Diabetes Medication Protocol Failed - 5/1/2024  5:47 PM        Failed - Last A1C < 7.5 and within past 6 months     Lab Results   Component Value Date    A1C 7.0 (H) 07/07/2023             Failed - In person appointment or virtual visit in the past 6 mos or appointment in next 3 mos     Recent Outpatient Visits              10 months ago Annual physical exam    [facility]    Office Visit    1 year ago Annual physical exam    [facility]    Office Visit    2 years ago Annual physical exam    [facility]    Office Visit    2 years ago [facility] health care    [facility]    Nurse Only    2 years ago Type 2 diabetes mellitus with microalbuminuria, without long-term current use of insulin ([facility])    [facility]    Office Visit                      Passed - Microalbumin procedure in past 12 months or taking ACE/ARB        Passed - EGFRCR or GFRNAA > 50     GFR Evaluation  EGFRCR: 85 , resulted on 7/7/2023          Passed - GFR in the past 12 months               Recent Outpatient Visits              10 months ago Annual physical exam    [facility]    Office Visit    1 year ago Annual physical exam    [facility]    Office Visit    2 years ago Annual physical exam    [facility] [place] Drew Herrmann Michael, MD    Office Visit    2 years ago [facility]ative health care    [facility]    Nurse Only    2 years ago Type 2 diabetes mellitus with microalbuminuria, without long-term current use of insulin (HCC)    [facility] [place] Drew Herrmann Michael, MD    Office Visit

## 2024-06-06 RX ORDER — LOSARTAN POTASSIUM 50 MG/1
50 TABLET ORAL DAILY
Qty: 90 TABLET | Refills: 3 | OUTPATIENT
Start: 2024-06-06

## 2024-08-06 ENCOUNTER — TELEPHONE (OUTPATIENT)
Dept: INTERNAL MEDICINE CLINIC | Facility: CLINIC | Age: 69
End: 2024-08-06

## 2024-08-08 NOTE — TELEPHONE ENCOUNTER
Wife calling, she is asking if we received refill request for patient  Confirmed we do have it, received on the 6th  Patient has about 5 day left  Informed wife that he needs to schedule visit  Last visit annual 7/2023  Due now  Advised of 3 full business day turn around time.

## 2024-08-11 RX ORDER — ATORVASTATIN CALCIUM 80 MG/1
80 TABLET, FILM COATED ORAL NIGHTLY
Qty: 30 TABLET | Refills: 0 | Status: SHIPPED | OUTPATIENT
Start: 2024-08-11 | End: 2024-09-17

## 2024-08-11 NOTE — TELEPHONE ENCOUNTER
Please review; protocol failed/ has no protocol      Fina Saldana, RN3 days ago     SG  Wife calling, she is asking if we received refill request for patient  Confirmed we do have it, received on the 6th  Patient has about 5 day left  Informed wife that he needs to schedule visit  Last visit annual 7/2023  Due now  Advised of 3 full business day turn around time.              No active /future labs noted   Message sent for patient to make an appointment.     Requested Prescriptions   Pending Prescriptions Disp Refills    BRILINTA 90 MG Oral Tab [Pharmacy Med Name: BRILINTA 90MG TABLETS] 180 tablet 3     Sig: TAKE 1 TABLET(90 MG) BY MOUTH EVERY 12 HOURS       There is no refill protocol information for this order       METFORMIN HCL 1000 MG Oral Tab [Pharmacy Med Name: METFORMIN 1000MG TABLETS] 180 tablet 0     Sig: TAKE 1 TABLET(1000 MG) BY MOUTH TWICE DAILY WITH MEALS       Diabetes Medication Protocol Failed - 8/8/2024  3:44 PM        Failed - Last A1C < 7.5 and within past 6 months     Lab Results   Component Value Date    A1C 7.0 (H) 07/07/2023             Failed - In person appointment or virtual visit in the past 6 mos or appointment in next 3 mos     Recent Out[facility]its              [facility]    [facility]    3 years ago Preventative health care    [facility]    Nurse Only    3 years ago Type 2 diabetes mellitus with microalbuminuria, without long-term current use of insulin (HCC)    [facility]                      Failed - EGFRCR or  GFRNAA > 50     GFR Evaluation            Failed - GFR in the past 12 months        Passed - Microalbumin procedure in past 12 months or taking ACE/ARB          atorvastatin 80 MG Oral Tab 90 tablet 1     Sig: Take 1 tablet (80 mg total) by mouth nightly.       Cholesterol Medication Protocol Failed - 8/8/2024  3:44 PM        Failed - ALT < 80     Lab Results   Component Value Date    ALT 35 07/07/2023             Failed - ALT resulted within past year        Failed - Lipid panel within past 12 months     Lab Results   Component Value Date    CHOLEST 90 07/07/2023    TRIG 88 07/07/2023    HDL 34 (L) 07/07/2023    LDL 38 07/07/2023    VLDL 12 07/07/2023    NONHDLC 56 07/07/2023             Failed - In person appointment or virtual visit in the past 12 mos or appointment in next 3 mos     Recent Outpatient Visits              [facility]    [facility]    [facility]    2 years [facility]    [facility]    3 years [facility]    [facility]    3 years ago Type 2 diabetes mellitus with microalbuminuria, without long-term current use of insulin ([facility])    [facility]    [facility]                         Recent Outpatient Visits              [facility]    2 [facility]    [facility]    2 years ago Annual physical exam    [facility] [place]    Office Visit    3 years ago Preventative health care    [facility] [place]    Nurse Only    3 years ago Type 2 diabetes mellitus with microalbuminuria, without long-term current use of insulin (HCC)    [facility]    [facility] Visit

## 2024-09-17 ENCOUNTER — LAB ENCOUNTER (OUTPATIENT)
Dept: LAB | Age: 69
End: 2024-09-17
Attending: INTERNAL MEDICINE
Payer: COMMERCIAL

## 2024-09-17 ENCOUNTER — OFFICE VISIT (OUTPATIENT)
Dept: INTERNAL MEDICINE CLINIC | Facility: CLINIC | Age: 69
End: 2024-09-17
Payer: COMMERCIAL

## 2024-09-17 VITALS
SYSTOLIC BLOOD PRESSURE: 134 MMHG | DIASTOLIC BLOOD PRESSURE: 72 MMHG | BODY MASS INDEX: 29.9 KG/M2 | HEIGHT: 73 IN | WEIGHT: 225.63 LBS | HEART RATE: 60 BPM

## 2024-09-17 DIAGNOSIS — I25.10 ASHD (ARTERIOSCLEROTIC HEART DISEASE): ICD-10-CM

## 2024-09-17 DIAGNOSIS — I70.0 AORTIC ATHEROSCLEROSIS (HCC): ICD-10-CM

## 2024-09-17 DIAGNOSIS — Z00.00 ANNUAL PHYSICAL EXAM: ICD-10-CM

## 2024-09-17 DIAGNOSIS — I10 ESSENTIAL HYPERTENSION: ICD-10-CM

## 2024-09-17 DIAGNOSIS — E11.69 DYSLIPIDEMIA ASSOCIATED WITH TYPE 2 DIABETES MELLITUS (HCC): ICD-10-CM

## 2024-09-17 DIAGNOSIS — E11.29 TYPE 2 DIABETES MELLITUS WITH MICROALBUMINURIA, WITHOUT LONG-TERM CURRENT USE OF INSULIN (HCC): ICD-10-CM

## 2024-09-17 DIAGNOSIS — E78.5 DYSLIPIDEMIA ASSOCIATED WITH TYPE 2 DIABETES MELLITUS (HCC): ICD-10-CM

## 2024-09-17 DIAGNOSIS — R80.9 TYPE 2 DIABETES MELLITUS WITH MICROALBUMINURIA, WITHOUT LONG-TERM CURRENT USE OF INSULIN (HCC): ICD-10-CM

## 2024-09-17 DIAGNOSIS — E11.21 DIABETIC NEPHROPATHY ASSOCIATED WITH TYPE 2 DIABETES MELLITUS (HCC): ICD-10-CM

## 2024-09-17 DIAGNOSIS — Z00.00 ANNUAL PHYSICAL EXAM: Primary | ICD-10-CM

## 2024-09-17 LAB
ALBUMIN SERPL-MCNC: 4.5 G/DL (ref 3.2–4.8)
ALBUMIN/GLOB SERPL: 1.7 {RATIO} (ref 1–2)
ALP LIVER SERPL-CCNC: 58 U/L
ALT SERPL-CCNC: 29 U/L
ANION GAP SERPL CALC-SCNC: 7 MMOL/L (ref 0–18)
AST SERPL-CCNC: 20 U/L (ref ?–34)
BILIRUB SERPL-MCNC: 1 MG/DL (ref 0.2–1.1)
BUN BLD-MCNC: 15 MG/DL (ref 9–23)
BUN/CREAT SERPL: 13 (ref 10–20)
CALCIUM BLD-MCNC: 9.8 MG/DL (ref 8.7–10.4)
CHLORIDE SERPL-SCNC: 107 MMOL/L (ref 98–112)
CHOLEST SERPL-MCNC: 118 MG/DL (ref ?–200)
CO2 SERPL-SCNC: 26 MMOL/L (ref 21–32)
COMPLEXED PSA SERPL-MCNC: 0.9 NG/ML (ref ?–4)
CREAT BLD-MCNC: 1.15 MG/DL
CREAT UR-SCNC: 133.8 MG/DL
DEPRECATED RDW RBC AUTO: 44.6 FL (ref 35.1–46.3)
EGFRCR SERPLBLD CKD-EPI 2021: 69 ML/MIN/1.73M2 (ref 60–?)
ERYTHROCYTE [DISTWIDTH] IN BLOOD BY AUTOMATED COUNT: 13.3 % (ref 11–15)
EST. AVERAGE GLUCOSE BLD GHB EST-MCNC: 171 MG/DL (ref 68–126)
FASTING PATIENT LIPID ANSWER: YES
FASTING STATUS PATIENT QL REPORTED: YES
GLOBULIN PLAS-MCNC: 2.7 G/DL (ref 2–3.5)
GLUCOSE BLD-MCNC: 146 MG/DL (ref 70–99)
HBA1C MFR BLD: 7.6 % (ref ?–5.7)
HCT VFR BLD AUTO: 46.6 %
HDLC SERPL-MCNC: 36 MG/DL (ref 40–59)
HGB BLD-MCNC: 15.3 G/DL
LDLC SERPL CALC-MCNC: 62 MG/DL (ref ?–100)
MCH RBC QN AUTO: 29.8 PG (ref 26–34)
MCHC RBC AUTO-ENTMCNC: 32.8 G/DL (ref 31–37)
MCV RBC AUTO: 90.7 FL
MICROALBUMIN UR-MCNC: 1.7 MG/DL
MICROALBUMIN/CREAT 24H UR-RTO: 12.7 UG/MG (ref ?–30)
NONHDLC SERPL-MCNC: 82 MG/DL (ref ?–130)
OSMOLALITY SERPL CALC.SUM OF ELEC: 293 MOSM/KG (ref 275–295)
PLATELET # BLD AUTO: 177 10(3)UL (ref 150–450)
POTASSIUM SERPL-SCNC: 5.4 MMOL/L (ref 3.5–5.1)
PROT SERPL-MCNC: 7.2 G/DL (ref 5.7–8.2)
RBC # BLD AUTO: 5.14 X10(6)UL
SODIUM SERPL-SCNC: 140 MMOL/L (ref 136–145)
TRIGL SERPL-MCNC: 105 MG/DL (ref 30–149)
TSI SER-ACNC: 2.14 MIU/ML (ref 0.55–4.78)
VLDLC SERPL CALC-MCNC: 16 MG/DL (ref 0–30)
WBC # BLD AUTO: 6.6 X10(3) UL (ref 4–11)

## 2024-09-17 PROCEDURE — 36415 COLL VENOUS BLD VENIPUNCTURE: CPT

## 2024-09-17 PROCEDURE — 80053 COMPREHEN METABOLIC PANEL: CPT

## 2024-09-17 PROCEDURE — 83036 HEMOGLOBIN GLYCOSYLATED A1C: CPT

## 2024-09-17 PROCEDURE — 82570 ASSAY OF URINE CREATININE: CPT

## 2024-09-17 PROCEDURE — 3075F SYST BP GE 130 - 139MM HG: CPT | Performed by: INTERNAL MEDICINE

## 2024-09-17 PROCEDURE — 80061 LIPID PANEL: CPT

## 2024-09-17 PROCEDURE — 85027 COMPLETE CBC AUTOMATED: CPT

## 2024-09-17 PROCEDURE — 82043 UR ALBUMIN QUANTITATIVE: CPT

## 2024-09-17 PROCEDURE — 84443 ASSAY THYROID STIM HORMONE: CPT

## 2024-09-17 PROCEDURE — 3008F BODY MASS INDEX DOCD: CPT | Performed by: INTERNAL MEDICINE

## 2024-09-17 PROCEDURE — 99397 PER PM REEVAL EST PAT 65+ YR: CPT | Performed by: INTERNAL MEDICINE

## 2024-09-17 PROCEDURE — 3078F DIAST BP <80 MM HG: CPT | Performed by: INTERNAL MEDICINE

## 2024-09-17 RX ORDER — METOPROLOL TARTRATE 50 MG
50 TABLET ORAL 2 TIMES DAILY
Qty: 180 TABLET | Refills: 1 | Status: SHIPPED | OUTPATIENT
Start: 2024-09-17

## 2024-09-17 RX ORDER — LOSARTAN POTASSIUM 50 MG/1
50 TABLET ORAL DAILY
Qty: 90 TABLET | Refills: 1 | Status: SHIPPED | OUTPATIENT
Start: 2024-09-17

## 2024-09-17 RX ORDER — ATORVASTATIN CALCIUM 80 MG/1
80 TABLET, FILM COATED ORAL NIGHTLY
Qty: 90 TABLET | Refills: 1 | Status: SHIPPED | OUTPATIENT
Start: 2024-09-17

## 2024-09-17 NOTE — PATIENT INSTRUCTIONS
Your blood pressure today was good at 134/72 and your exam was normal  Await results of blood and urine testing  Please complete the stool card for colon cancer screening  Please schedule an eye exam soon  Continue current medication, healthy diet and regular walking  Return visit in 6 months

## 2024-09-17 NOTE — H&P
Shayan Steward is a 69 year old male who presents for a complete physical exam, as well as for follow-up of ASHD, type 2 diabetes with nephropathy, hypertension and dyslipidemia.  HPI:   His last visit here was for a physical July 2023.  He feels very well and has no specific issues for discussion today.  He continues to work full-time.    Past medical and past surgical histories reviewed, as outlined below.  Medications reviewed, as listed.  No medication allergies.    He saw Cardiology this past January.  Echo was ordered at that time but has not yet been done.  No home Accu-Chek or BP monitoring.  He tries to watch his diet and walks fairly regularly.  Weight up 4 pounds since physical July 2023.  No recent eye exam.  Due for colorectal cancer screening.  Tdap vaccine July 2021.  Declines influenza and Pneumococcal vaccines.    Wt Readings from Last 4 Encounters:   09/17/24 225 lb 9.6 oz (102.3 kg)   07/07/23 221 lb 3.2 oz (100.3 kg)   11/01/22 214 lb (97.1 kg)   06/14/22 211 lb 11.2 oz (96 kg)     Body mass index is 29.76 kg/m².     Current Outpatient Medications   Medication Sig Dispense Refill    losartan 50 MG Oral Tab Take 1 tablet (50 mg total) by mouth daily. 90 tablet 1    metoprolol tartrate 50 MG Oral Tab Take 1 tablet (50 mg total) by mouth 2 (two) times daily. 180 tablet 1    metFORMIN HCl 1000 MG Oral Tab Take 1 tablet (1,000 mg total) by mouth 2 (two) times daily with meals. 180 tablet 1    atorvastatin 80 MG Oral Tab Take 1 tablet (80 mg total) by mouth nightly. 90 tablet 1    ticagrelor (BRILINTA) 90 MG Oral Tab Take 1 tablet (90 mg total) by mouth Q12H. 180 tablet 1    Glucose Blood In Vitro Strip Check blood glucose daily,E11.9 100 strip 11    aspirin 81 MG Oral Tab EC Take 1 tablet (81 mg total) by mouth daily. With food 30 tablet 5    Blood Glucose Monitoring Suppl (ACCU-CHEK COMPACT PLUS CARE) Does not apply Kit Check blood glucose daily,E11.9 1 kit 0    Lancets Does not apply Misc Check blood  glucose daily,E11.9 200 each 1     No Known Allergies   Past Medical History:    Acute non-ST elevation myocardial infarction (NSTEMI) ([facility])    Aortic atherosclerosis (HCC)    CXR     ASHD (arteriosclerotic heart disease)    Cardiac cath 7- with posterobasal and diaphragmatic akinesia, EF 40-45%, mild MR, 70-80% distal LAD, 90% proximal LCx, 80-90% distal LCx, occlusion proximal RI, 99% distal RCA, s/p KTAIE RCA LCx and OM; Echo 10- with EF 70%    Diabetic nephropathy (HCC)    Microalbuminuria    Dyslipidemia associated with type 2 diabetes mellitus (HCC)    Essential hypertension    Type 2 diabetes mellitus (HCC)    Visual impairment      Past Surgical History:   Procedure Laterality Date    Other      Anal fissure repair      Family History   Problem Relation Age of Onset    Cancer Paternal Grandmother         Details unknown    Dementia Mother     Hypertension Mother     Heart Disease Father          MI age 63    Colon Cancer Paternal Grandfather     Heart Disease Paternal Uncle          MI age 63      Social History:  Social History     Socioeconomic History    Marital status:    Tobacco Use    Smoking status: Never    Smokeless tobacco: Never   Vaping Use    Vaping status: Never Used   Substance and Sexual Activity    Alcohol use: No     Alcohol/week: 0.0 standard drinks of alcohol    Drug use: No    Sexual activity: Never           REVIEW OF SYSTEMS:   GENERAL: No fever  LUNGS: No cough wheezing or shortness of breath  CARDIAC: No lightheadedness palpitations or chest pain  GI: No anorexia heartburn dysphagia nausea vomiting abdominal pain diarrhea constipation or rectal bleeding  : No urinary frequency dysuria or hematuria  MUSCULOSKELETAL: No leg swelling.  No foot ulcerations or lesions  NEURO: No headaches.  No numbness or tingling in either foot    EXAM:   GENERAL: Pleasant male appearing well in no distress  /72   Pulse 60   Ht 6' 1\" (1.854 m)   Wt 225 lb 9.6 oz  (102.3 kg)   BMI 29.76 kg/m²   HEENT: Anicteric, conjunctiva pink, oropharynx normal  NECK: Supple without mass or thyromegaly  NODES: No peripheral adenopathy  LUNGS: Resonant to percussion and clear to auscultation  CARDIAC: Rhythm regular S1 S2 normal without murmur, with trace-1+ pitting edema distal lower extremities bilaterally  ABDOMEN: Bowel sounds normal soft nontender without mass or hepatosplenomegaly  EXTREMITIES: Bilateral barefoot skin diabetic exam is normal, visualized feet and the appearance is normal.  Bilateral monofilament/sensation of both feet is normal.  Pulsation pedal pulse exam of both lower legs/feet is normal as well  PULSES: 2+ bilateral dorsalis pedis and posterior tibial  NEURO: Sensory testing with the 10 g monofilament diabetic sensory exam instrument is normal in both feet    ASSESSMENT AND PLAN:   Shayan Steward is a 69 year old male who presents for a complete physical exam.     1. Annual physical exam  Discussed and recommended influenza and Pneumococcal vaccines.  He declines  Check CMP CBC glycohemoglobin lipid profile screening PSA TSH with reflex T4 and urine microalbumin today.  Order sent  Discussed and recommended colorectal cancer screening.  He is amenable to stool testing.  FIT test ordered  Recommend eye exam for retinopathy screening.  Referral for Ophthalmology given.  Encouraged him to schedule an appointment soon  Continue current medications.  Prescription refills sent to pharmacy  Reinforced healthy diet and regular physical activity  Return visit in 6 months for recheck  - Comp Metabolic Panel (14); Future  - CBC, Platelet; No Differential; Future  - Hemoglobin A1C; Future  - Lipid Panel; Future  - PSA Total, Screen; Future  - TSH W Reflex To Free T4; Future  - Microalb/Creat Ratio, Random Urine; Future  - Occult Blood, Fecal, FIT Immunoassay; Future    2. ASHD (arteriosclerotic heart disease)  Asymptomatic  Continue medications including statin and  antiplatelets.  Ongoing follow-up with Cardiology    3. Type 2 diabetes mellitus with microalbuminuria, without long-term current use of insulin (HCC)  Continue metformin and await labs  Advised for retinopathy screening as above  - Ophthalmology Referral - In Network    4. Diabetic nephropathy associated with type 2 diabetes mellitus (HCC)  Microalbuminuria.  Continue ARB and await labs    5. Essential hypertension  Well-controlled.  Continue current medication    6. Dyslipidemia associated with type 2 diabetes mellitus (HCC)  Continue statin and await labs    7. Aortic atherosclerosis (HCC)  Asymptomatic.  Continue risk factor control including statin, Brilinta and aspirin      Connor Burgos MD  9/17/2024  8:20 AM

## 2025-02-01 RX ORDER — LOSARTAN POTASSIUM 50 MG/1
50 TABLET ORAL DAILY
Qty: 90 TABLET | Refills: 1 | Status: SHIPPED | OUTPATIENT
Start: 2025-02-01

## 2025-02-01 RX ORDER — METOPROLOL TARTRATE 50 MG
50 TABLET ORAL 2 TIMES DAILY
Qty: 180 TABLET | Refills: 1 | Status: SHIPPED | OUTPATIENT
Start: 2025-02-01

## 2025-02-01 RX ORDER — ATORVASTATIN CALCIUM 80 MG/1
80 TABLET, FILM COATED ORAL NIGHTLY
Qty: 90 TABLET | Refills: 1 | Status: SHIPPED | OUTPATIENT
Start: 2025-02-01

## 2025-02-25 ENCOUNTER — TELEPHONE (OUTPATIENT)
Dept: INTERNAL MEDICINE CLINIC | Facility: CLINIC | Age: 70
End: 2025-02-25

## 2025-02-25 NOTE — TELEPHONE ENCOUNTER
Pt. Overdue for Care Gaps, No upcoming appointments. [employer] message sent, tried calling unable to leave voice message due to mail box being full.

## (undated) DIAGNOSIS — I25.10 ASHD (ARTERIOSCLEROTIC HEART DISEASE): Primary | ICD-10-CM

## (undated) NOTE — MR AVS SNAPSHOT
[facility] [address] [place] [address]  [address]  [id number] [phone]               Thank you for choosing us for your health care visit with Thierno Isaacs MD.  We are glad to serve you and happy to provide you with this summary of yo This list is accurate as of: 4/27/17  6:08 PM.  Always use your most recent med list.                ACCU-CHEK COMPACT PLUS CARE Kit   Check blood glucose daily,E11.9           Atorvastatin Calcium 20 MG Tabs   Take 1 tablet (20 mg total) by mouth nightly.

## (undated) NOTE — Clinical Note
Pt has apt tomorrow. TE routed reg apt issues. RN outreach complete, no acute issues.  Qualifies for TCM until 7/29/19

## (undated) NOTE — MR AVS SNAPSHOT
[address]  [address] [facility]  [phone]               Thank you for choosing us for your health care visit with Gemini Jon MD.  We are glad to serve you and happy to provide you with this summary Hemoglobin A1C [E]    Complete by:  Jan 26, 2017 (Approximate)    Assoc Dx:   Newly diagnosed diabetes ([facility] [address] [E11.9]           Lipid Panel [E]    Complete by:  Jan 26, 2017 (Approximate)    Assoc Dx:  Hyperlipidemia LDL goal <100 [E78.5]           Comp Met 150 minutes per week. Moderation of alcohol consumption Men: limit to <= 2 drinks* per day. Women and lighter weight persons: limit to <= 1 drink* per day.                            Visit [employer] online at  [email or web address]